# Patient Record
Sex: MALE | Race: WHITE | HISPANIC OR LATINO | Employment: FULL TIME | URBAN - METROPOLITAN AREA
[De-identification: names, ages, dates, MRNs, and addresses within clinical notes are randomized per-mention and may not be internally consistent; named-entity substitution may affect disease eponyms.]

---
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
## 2022-12-21 ENCOUNTER — OFFICE VISIT (OUTPATIENT)
Dept: FAMILY MEDICINE | Facility: CLINIC | Age: 22
End: 2022-12-21

## 2022-12-21 VITALS
HEART RATE: 91 BPM | OXYGEN SATURATION: 98 % | SYSTOLIC BLOOD PRESSURE: 132 MMHG | HEIGHT: 75 IN | WEIGHT: 310 LBS | DIASTOLIC BLOOD PRESSURE: 84 MMHG | BODY MASS INDEX: 38.54 KG/M2 | TEMPERATURE: 97.9 F

## 2022-12-21 DIAGNOSIS — Z00.00 ROUTINE GENERAL MEDICAL EXAMINATION AT A HEALTH CARE FACILITY: Primary | ICD-10-CM

## 2022-12-21 DIAGNOSIS — N47.1 PHIMOSIS OF PENIS: ICD-10-CM

## 2022-12-21 DIAGNOSIS — E66.9 OBESITY (BMI 30-39.9): ICD-10-CM

## 2022-12-21 DIAGNOSIS — L70.0 ACNE VULGARIS: ICD-10-CM

## 2022-12-21 PROCEDURE — 99395 PREV VISIT EST AGE 18-39: CPT | Performed by: FAMILY MEDICINE

## 2022-12-21 NOTE — PROGRESS NOTES
3  SUBJECTIVE:   CC: David Orta is an 22 year old male who presents for preventive health visit.       Patient has been advised of split billing requirements and indicates understanding: Yes  Healthy Habits:    Do you get at least three servings of calcium containing foods daily (dairy, green leafy vegetables, etc.)? yes    Amount of exercise or daily activities, outside of work: 4 day(s) per week    Problems taking medications regularly No    Medication side effects: No    Have you had an eye exam in the past two years? yes    Do you see a dentist twice per year? yes    Do you have sleep apnea, excessive snoring or daytime drowsiness?no          Today's PHQ-2 Score:   PHQ-2 ( 1999 Pfizer) 12/21/2022 11/24/2021   Q1: Little interest or pleasure in doing things 0 0   Q2: Feeling down, depressed or hopeless 0 0   PHQ-2 Score 0 0   PHQ-2 Total Score (12-17 Years)- Positive if 3 or more points; Administer PHQ-A if positive - -       Abuse: Current or Past(Physical, Sexual or Emotional)- No  Do you feel safe in your environment? Yes        Social History     Tobacco Use     Smoking status: Never     Smokeless tobacco: Never   Substance Use Topics     Alcohol use: No     If you drink alcohol do you typically have >3 drinks per day or >7 drinks per week? No                      Last PSA: No results found for: PSA    Reviewed orders with patient. Reviewed health maintenance and updated orders accordingly - Yes  BP Readings from Last 3 Encounters:   12/21/22 132/84   11/24/21 134/82   10/28/20 130/84    Wt Readings from Last 3 Encounters:   12/21/22 140.6 kg (310 lb)   11/24/21 131.9 kg (290 lb 12.8 oz) (>99 %, Z= 2.96)*   10/28/20 125.1 kg (275 lb 12.8 oz) (>99 %, Z= 2.78)*     * Growth percentiles are based on CDC (Boys, 2-20 Years) data.                  Patient Active Problem List   Diagnosis     Health Care Home     Abnormal fasting glucose     Obesity (BMI 30-39.9)     Left varicocele     Phimosis     ACP (advance  care planning)     Acne vulgaris     Past Surgical History:   Procedure Laterality Date     LAPAROSCOPIC APPENDECTOMY N/A 4/13/2015    Procedure: LAPAROSCOPIC APPENDECTOMY;  Surgeon: Kapil Griffin MD;  Location:  OR       Social History     Tobacco Use     Smoking status: Never     Smokeless tobacco: Never   Substance Use Topics     Alcohol use: No     Family History   Problem Relation Age of Onset     Diabetes Mother      Diabetes Father      Cerebrovascular Disease Maternal Aunt          No current outpatient medications on file.     Allergies   Allergen Reactions     Cats      Recent Labs   Lab Test 11/24/21  1509 10/28/20  1149 10/28/20  0849 11/22/19  0000 12/04/18  0951 10/19/17  0907 04/12/15  2319   A1C  --   --  5.3  --   --   --   --    LDL 89 84  --  64  --    < >  --    HDL 51 52  --  58  --    < >  --    TRIG 48 51  --  39  --    < >  --    ALT  --   --   --   --  67*  --  77*   CR  --   --   --   --  0.78  --  0.54   GFRESTIMATED  --   --   --   --   --   --  GFR not calculated, patient <16 years old.  Non  GFR Calc     GFRESTBLACK  --   --   --   --   --   --  GFR not calculated, patient <16 years old.   GFR Calc     POTASSIUM  --   --   --   --  4.3  --  3.8    < > = values in this interval not displayed.        Reviewed and updated as needed this visit by clinical staff   Tobacco  Allergies  Meds  Problems             Reviewed and updated as needed this visit by Provider                 No past medical history on file.   Past Surgical History:   Procedure Laterality Date     LAPAROSCOPIC APPENDECTOMY N/A 4/13/2015    Procedure: LAPAROSCOPIC APPENDECTOMY;  Surgeon: Kapil Griffin MD;  Location:  OR       ROS:  CONSTITUTIONAL: NEGATIVE for fever, chills, change in weight  INTEGUMENTARY/SKIN: NEGATIVE for worrisome rashes, moles or lesions  EYES: NEGATIVE for vision changes or irritation  ENT: NEGATIVE for ear, mouth and throat problems  RESP:  "NEGATIVE for significant cough or SOB  CV: NEGATIVE for chest pain, palpitations or peripheral edema  GI: NEGATIVE for nausea, abdominal pain, heartburn, or change in bowel habits   male: negative for dysuria, hematuria, decreased urinary stream, erectile dysfunction, urethral discharge  MUSCULOSKELETAL: NEGATIVE for significant arthralgias or myalgia  NEURO: NEGATIVE for weakness, dizziness or paresthesias  ENDOCRINE: NEGATIVE for temperature intolerance, skin/hair changes  HEME/ALLERGY/IMMUNE: NEGATIVE for bleeding problems  PSYCHIATRIC: NEGATIVE for changes in mood or affect    OBJECTIVE:   /84 (BP Location: Right arm, Patient Position: Sitting, Cuff Size: Adult Large)   Pulse 91   Temp 97.9  F (36.6  C) (Temporal)   Ht 1.892 m (6' 2.5\")   Wt 140.6 kg (310 lb)   SpO2 98%   BMI 39.27 kg/m    EXAM:  GENERAL: healthy, alert and no distress  EYES: Eyes grossly normal to inspection, PERRL and conjunctivae and sclerae normal  HENT: ear canals and TM's normal, nose and mouth without ulcers or lesions  NECK: no adenopathy, no asymmetry, masses, or scars and thyroid normal to palpation  RESP: lungs clear to auscultation - no rales, rhonchi or wheezes  CV: regular rate and rhythm, normal S1 S2, no S3 or S4, no murmur, click or rub, no peripheral edema and peripheral pulses strong  ABDOMEN: soft, nontender, no hepatosplenomegaly, no masses and bowel sounds normal   (male): no lesions, unable to fully retract foreskin lesions or urethral discharge, no hernia  MS: no gross musculoskeletal defects noted, no edema  SKIN: no suspicious lesions or rashes, mild to moderate inflammatory acne face and trunk  NEURO: Normal strength and tone, mentation intact and speech normal  PSYCH: mentation appears normal, affect normal/bright    Diagnostic Test Results:  Labs reviewed in Epic    ASSESSMENT/PLAN:   (Z00.00) Routine general medical examination at a health care facility  (primary encounter diagnosis)  Comment: " "discussed preventitive healthcare   Plan: Continue to work on healthy diet and exercise, discussed healthy habits     (L70.0) Acne vulgaris  Comment: pt not bothered by acne- discussed options if he would like to treat again in future  Plan:     (E66.9) Obesity (BMI 30-39.9)  Comment: discussed healthy eating habits- main problem in portion control  Plan: Continue to work on healthy diet and exercise, discussed healthy habits     (N47.1) Phimosis of penis  Comment: pt has seen urology in past and treated with cream-not better -recommnd he go back to urology-he will look for info at home  Plan: call if needs new referral-would be adult now    Patient has been advised of split billing requirements and indicates understanding: Yes  COUNSELING:  Reviewed preventive health counseling, as reflected in patient instructions       Regular exercise       Healthy diet/nutrition       Vision screening       Immunizations    Declined: Human Papillomavirus and Influenza             Estimated body mass index is 39.27 kg/m  as calculated from the following:    Height as of this encounter: 1.892 m (6' 2.5\").    Weight as of this encounter: 140.6 kg (310 lb).    Weight management plan: Discussed healthy diet and exercise guidelines    He reports that he has never smoked. He has never used smokeless tobacco.      Counseling Resources:  ATP IV Guidelines  Pooled Cohorts Equation Calculator  FRAX Risk Assessment  ICSI Preventive Guidelines  Dietary Guidelines for Americans, 2010  USDA's MyPlate  ASA Prophylaxis  Lung CA Screening    Anastacio Lawrence MD  [facility]  "

## 2022-12-21 NOTE — NURSING NOTE
Chief Complaint   Patient presents with     Physical     Non-fasting today      Pre-visit Screening:  Immunizations:  up to date  Colonoscopy:  NA  Mammogram: NA  Asthma Action Test/Plan:  NA  PHQ9:  NA  GAD7:  NA  Questioned patient about current smoking habits Pt. has never smoked.  Ok to leave detailed message on voice mail for today's visit only Yes, phone # [phone]

## 2023-02-10 ENCOUNTER — OFFICE VISIT (OUTPATIENT)
Dept: UROLOGY | Facility: CLINIC | Age: 23
End: 2023-02-10

## 2023-02-10 VITALS
SYSTOLIC BLOOD PRESSURE: 133 MMHG | DIASTOLIC BLOOD PRESSURE: 85 MMHG | WEIGHT: 295 LBS | HEART RATE: 80 BPM | BODY MASS INDEX: 36.68 KG/M2 | HEIGHT: 75 IN

## 2023-02-10 DIAGNOSIS — N47.1 PHIMOSIS: Primary | ICD-10-CM

## 2023-02-10 PROCEDURE — 99203 OFFICE O/P NEW LOW 30 MIN: CPT | Performed by: UROLOGY

## 2023-02-10 ASSESSMENT — PAIN SCALES - GENERAL: PAINLEVEL: NO PAIN (0)

## 2023-02-10 NOTE — LETTER
"2/10/2023       RE: David Orta  [id number]  [facility] [place] [id number]     Dear Colleague,    Thank you for referring your patient, David Orta, to the [facility] [place] at [facility]. Please see a copy of my visit note below.    [facility]  Main Office: [address]  [address]  [place] [address]       CHIEF COMPLAINT:  Difficulties with foreskin    HISTORY:   This is a healthy 22-year-old gentleman who reports difficulties with his foreskin.  He tells me that he \"has never been able to pull back my foreskin \".  He understands that he is supposed to pulling back the foreskin periodically and in the shower to keep things clean but says he has never been able to pull it back.  He says he did see a urologist about 2 or 3 years ago and was given a cream to use but he never really used it.      PAST MEDICAL HISTORY: History reviewed. No pertinent past medical history.    PAST SURGICAL HISTORY:   Past Surgical History:   Procedure Laterality Date     LAPAROSCOPIC APPENDECTOMY N/A 04/13/2015    Procedure: LAPAROSCOPIC APPENDECTOMY;  Surgeon: Kapil Griffin MD;  Location:  OR       FAMILY HISTORY:   Family History   Problem Relation Age of Onset     Diabetes Mother      Diabetes Father      Cerebrovascular Disease Maternal Aunt        SOCIAL HISTORY:   Social History     Tobacco Use     Smoking status: Never     Smokeless tobacco: Never   Substance Use Topics     Alcohol use: No          Allergies   Allergen Reactions     Cats        No current outpatient medications on file.    Review Of Systems:  Skin: No rash, pruritis, or skin pigmentation  Eyes: No changes in vision  Ears/Nose/Throat: No changes in hearing, no nosebleeds  Respiratory: No shortness of breath, dyspnea on exertion, cough, or hemoptysis  Cardiovascular: No chest pain or palpitations  Gastrointestinal: No diarrhea or constipation. No abdominal pain. No " "hematochezia  Genitourinary: see HPI  Musculoskeletal: No pain or swelling of joints, normal range of motion  Neurologic: No weakness or tremors  Psychiatric: No recent changes in memory or mood  Hematologic/Lymphatic/Immunologic: No easy bruising or enlarged lymph nodes  Endocrine: No weight gain or loss      PHYSICAL EXAM:    /85   Pulse 80   Ht 1.905 m (6' 3\")   Wt 133.8 kg (295 lb)   BMI 36.87 kg/m    General appearance: In NAD, conversant  HEENT: Normocephalic and atraumatic, anicteric sclera  Cardiovascular: Not examined  Respiratory: normal, non-labored breathing  Gastrointestinal: negative, Abdomen soft, non-tender, and non-distended.   Musculoskeletal: Not Examined  Peripheral Vascular/extremity: No peripheral edema  Skin: Normal temperature, turgor, and texture. No rash  Psychiatric: Appropriate affect, alert and oriented to person, place, and time    Penis: Normal.  He has a normal uncircumcised circumcised penis without phimosis or balanitis.  Please see notes below for details.  Scrotal skin: Normal, no lesions  Testicles: Normal to palpation bilaterally  Epididymis: Normal to palpation bilaterally  Lymphatic: Normal inguinal lymph nodes    Digital Rectal Exam:     Cystoscopy: Not done      PSA:     UA RESULTS:  Recent Labs   Lab Test 04/12/15  2357   COLOR Yellow   APPEARANCE Clear   URINEGLC Negative   URINEBILI Negative   URINEKETONE 5*   SG 1.029   UBLD Negative   URINEPH 6.5   PROTEIN 10*   NITRITE Negative   LEUKEST Negative   RBCU <1   WBCU <1       Bladder Scan:     Other Labs:      Imaging Studies: None      CLINICAL IMPRESSION:   Problems with foreskin    PLAN:   On examination today, the penile examination is visibly normal.  The foreskin appears normal.  When the foreskin is pulled back the glans penis appears completely normal with no balanitis, no effusions, no skin bridges.  However, he was not able to let me foreskin back completely because he says the glans penis becomes very " sensitive when he starts to pull back the foreskin.  I did ask him to pull back foreskin for me and he was able to pull back the foreskin, although he did this very hesitantly due to what he described as discomfort.  The glans penis is completely normal.  The foreskin appears normal and shows no phimotic rings or other abnormalities.  There is no smegma under the foreskin and tissues were all quite clean.    I explained to him that it is not entirely clear what is causing his discomfort with his examination looking normal.  He could certainly proceed with circumcision and I offered him the solution.  We did discuss circumcision today.  However, he is quite sensitive in the glans penis and understands that this would likely lead to a difficult recovery.  However, in light of this I counseled him that I really do not think he needed a circumcision at this time.  His examination appears very normal to me.  We discussed possibly trying a steroid cream, explained that this is an anti-inflammatory.  However, I see no evidence of inflammation or balanitis at this time.  Another more conservative solution would be to have him pull back the foreskin in the shower on at least a daily basis.  He is sensitive at this time for uncertain reasons, but the hope would be that sensitivity would diminish with time.  Either way, it does appear he is cleaning under the foreskin somehow as tissues are quite clean today.    He will try to pull back the foreskin in the shower on a daily basis and he is welcome to follow-up with me as needed in the future.      Ryley Medrano MD

## 2023-02-10 NOTE — NURSING NOTE
Chief Complaint   Patient presents with     Phimosis     Pt states he has never been able to fully pull back foreskin, pt states he has tried but this is painful.    Shefali Spring, CMA

## 2023-02-10 NOTE — PROGRESS NOTES
"[facility]  Main Office: [address]  [address]  [place] [address]       CHIEF COMPLAINT:  Difficulties with foreskin    HISTORY:   This is a healthy 22-year-old gentleman who reports difficulties with his foreskin.  He tells me that he \"has never been able to pull back my foreskin \".  He understands that he is supposed to pulling back the foreskin periodically and in the shower to keep things clean but says he has never been able to pull it back.  He says he did see a urologist about 2 or 3 years ago and was given a cream to use but he never really used it.      PAST MEDICAL HISTORY: History reviewed. No pertinent past medical history.    PAST SURGICAL HISTORY:   Past Surgical History:   Procedure Laterality Date     LAPAROSCOPIC APPENDECTOMY N/A 04/13/2015    Procedure: LAPAROSCOPIC APPENDECTOMY;  Surgeon: Kapil Griffin MD;  Location:  OR       FAMILY HISTORY:   Family History   Problem Relation Age of Onset     Diabetes Mother      Diabetes Father      Cerebrovascular Disease Maternal Aunt        SOCIAL HISTORY:   Social History     Tobacco Use     Smoking status: Never     Smokeless tobacco: Never   Substance Use Topics     Alcohol use: No          Allergies   Allergen Reactions     Cats        No current outpatient medications on file.    Review Of Systems:  Skin: No rash, pruritis, or skin pigmentation  Eyes: No changes in vision  Ears/Nose/Throat: No changes in hearing, no nosebleeds  Respiratory: No shortness of breath, dyspnea on exertion, cough, or hemoptysis  Cardiovascular: No chest pain or palpitations  Gastrointestinal: No diarrhea or constipation. No abdominal pain. No hematochezia  Genitourinary: see HPI  Musculoskeletal: No pain or swelling of joints, normal range of motion  Neurologic: No weakness or tremors  Psychiatric: No recent changes in memory or mood  Hematologic/Lymphatic/Immunologic: No easy bruising or enlarged lymph nodes  Endocrine: No weight gain or " "loss      PHYSICAL EXAM:    /85   Pulse 80   Ht 1.905 m (6' 3\")   Wt 133.8 kg (295 lb)   BMI 36.87 kg/m    General appearance: In NAD, conversant  HEENT: Normocephalic and atraumatic, anicteric sclera  Cardiovascular: Not examined  Respiratory: normal, non-labored breathing  Gastrointestinal: negative, Abdomen soft, non-tender, and non-distended.   Musculoskeletal: Not Examined  Peripheral Vascular/extremity: No peripheral edema  Skin: Normal temperature, turgor, and texture. No rash  Psychiatric: Appropriate affect, alert and oriented to person, place, and time    Penis: Normal.  He has a normal uncircumcised circumcised penis without phimosis or balanitis.  Please see notes below for details.  Scrotal skin: Normal, no lesions  Testicles: Normal to palpation bilaterally  Epididymis: Normal to palpation bilaterally  Lymphatic: Normal inguinal lymph nodes    Digital Rectal Exam:     Cystoscopy: Not done      PSA:     UA RESULTS:  Recent Labs   Lab Test 04/12/15  2357   COLOR Yellow   APPEARANCE Clear   URINEGLC Negative   URINEBILI Negative   URINEKETONE 5*   SG 1.029   UBLD Negative   URINEPH 6.5   PROTEIN 10*   NITRITE Negative   LEUKEST Negative   RBCU <1   WBCU <1       Bladder Scan:     Other Labs:      Imaging Studies: None      CLINICAL IMPRESSION:   Problems with foreskin    PLAN:   On examination today, the penile examination is visibly normal.  The foreskin appears normal.  When the foreskin is pulled back the glans penis appears completely normal with no balanitis, no effusions, no skin bridges.  However, he was not able to let me foreskin back completely because he says the glans penis becomes very sensitive when he starts to pull back the foreskin.  I did ask him to pull back foreskin for me and he was able to pull back the foreskin, although he did this very hesitantly due to what he described as discomfort.  The glans penis is completely normal.  The foreskin appears normal and shows no " phimotic rings or other abnormalities.  There is no smegma under the foreskin and tissues were all quite clean.    I explained to him that it is not entirely clear what is causing his discomfort with his examination looking normal.  He could certainly proceed with circumcision and I offered him the solution.  We did discuss circumcision today.  However, he is quite sensitive in the glans penis and understands that this would likely lead to a difficult recovery.  However, in light of this I counseled him that I really do not think he needed a circumcision at this time.  His examination appears very normal to me.  We discussed possibly trying a steroid cream, explained that this is an anti-inflammatory.  However, I see no evidence of inflammation or balanitis at this time.  Another more conservative solution would be to have him pull back the foreskin in the shower on at least a daily basis.  He is sensitive at this time for uncertain reasons, but the hope would be that sensitivity would diminish with time.  Either way, it does appear he is cleaning under the foreskin somehow as tissues are quite clean today.    He will try to pull back the foreskin in the shower on a daily basis and he is welcome to follow-up with me as needed in the future.      Ryley Medrano MD

## 2023-12-16 ENCOUNTER — APPOINTMENT (OUTPATIENT)
Dept: GENERAL RADIOLOGY | Facility: CLINIC | Age: 23
End: 2023-12-16
Attending: EMERGENCY MEDICINE
Payer: COMMERCIAL

## 2023-12-16 ENCOUNTER — HOSPITAL ENCOUNTER (EMERGENCY)
Facility: CLINIC | Age: 23
Discharge: HOME | End: 2023-12-16
Attending: EMERGENCY MEDICINE | Admitting: EMERGENCY MEDICINE
Payer: COMMERCIAL

## 2023-12-16 VITALS
RESPIRATION RATE: 18 BRPM | BODY MASS INDEX: 37.22 KG/M2 | HEIGHT: 74 IN | DIASTOLIC BLOOD PRESSURE: 86 MMHG | OXYGEN SATURATION: 98 % | HEART RATE: 76 BPM | TEMPERATURE: 97.8 F | SYSTOLIC BLOOD PRESSURE: 128 MMHG | WEIGHT: 290 LBS

## 2023-12-16 DIAGNOSIS — R07.9 CHEST PAIN, UNSPECIFIED TYPE: ICD-10-CM

## 2023-12-16 DIAGNOSIS — R42 LIGHTHEADEDNESS: ICD-10-CM

## 2023-12-16 LAB
ANION GAP SERPL CALCULATED.3IONS-SCNC: 10 MMOL/L (ref 7–15)
BASOPHILS # BLD AUTO: 0.1 10E3/UL (ref 0–0.2)
BASOPHILS NFR BLD AUTO: 1 %
BUN SERPL-MCNC: 14.3 MG/DL (ref 6–20)
CALCIUM SERPL-MCNC: 8.9 MG/DL (ref 8.6–10)
CHLORIDE SERPL-SCNC: 103 MMOL/L (ref 98–107)
CREAT SERPL-MCNC: 0.73 MG/DL (ref 0.67–1.17)
DEPRECATED HCO3 PLAS-SCNC: 22 MMOL/L (ref 22–29)
EGFRCR SERPLBLD CKD-EPI 2021: >90 ML/MIN/1.73M2
EOSINOPHIL # BLD AUTO: 0.2 10E3/UL (ref 0–0.7)
EOSINOPHIL NFR BLD AUTO: 3 %
ERYTHROCYTE [DISTWIDTH] IN BLOOD BY AUTOMATED COUNT: 11.9 % (ref 10–15)
GLUCOSE SERPL-MCNC: 109 MG/DL (ref 70–99)
HCT VFR BLD AUTO: 45.4 % (ref 40–53)
HGB BLD-MCNC: 15 G/DL (ref 13.3–17.7)
HOLD SPECIMEN: NORMAL
HOLD SPECIMEN: NORMAL
IMM GRANULOCYTES # BLD: 0 10E3/UL
IMM GRANULOCYTES NFR BLD: 1 %
LYMPHOCYTES # BLD AUTO: 2.1 10E3/UL (ref 0.8–5.3)
LYMPHOCYTES NFR BLD AUTO: 26 %
MCH RBC QN AUTO: 28.9 PG (ref 26.5–33)
MCHC RBC AUTO-ENTMCNC: 33 G/DL (ref 31.5–36.5)
MCV RBC AUTO: 88 FL (ref 78–100)
MONOCYTES # BLD AUTO: 0.5 10E3/UL (ref 0–1.3)
MONOCYTES NFR BLD AUTO: 6 %
NEUTROPHILS # BLD AUTO: 5.2 10E3/UL (ref 1.6–8.3)
NEUTROPHILS NFR BLD AUTO: 63 %
NRBC # BLD AUTO: 0 10E3/UL
NRBC BLD AUTO-RTO: 0 /100
PLATELET # BLD AUTO: 234 10E3/UL (ref 150–450)
POTASSIUM SERPL-SCNC: 3.9 MMOL/L (ref 3.4–5.3)
RBC # BLD AUTO: 5.19 10E6/UL (ref 4.4–5.9)
SODIUM SERPL-SCNC: 135 MMOL/L (ref 135–145)
TROPONIN T SERPL HS-MCNC: <6 NG/L
WBC # BLD AUTO: 8.1 10E3/UL (ref 4–11)

## 2023-12-16 PROCEDURE — 36415 COLL VENOUS BLD VENIPUNCTURE: CPT | Performed by: EMERGENCY MEDICINE

## 2023-12-16 PROCEDURE — 71046 X-RAY EXAM CHEST 2 VIEWS: CPT

## 2023-12-16 PROCEDURE — 80048 BASIC METABOLIC PNL TOTAL CA: CPT | Performed by: EMERGENCY MEDICINE

## 2023-12-16 PROCEDURE — 84484 ASSAY OF TROPONIN QUANT: CPT | Performed by: EMERGENCY MEDICINE

## 2023-12-16 PROCEDURE — 93005 ELECTROCARDIOGRAM TRACING: CPT

## 2023-12-16 PROCEDURE — 99285 EMERGENCY DEPT VISIT HI MDM: CPT | Mod: 25

## 2023-12-16 PROCEDURE — 85004 AUTOMATED DIFF WBC COUNT: CPT | Performed by: EMERGENCY MEDICINE

## 2023-12-16 ASSESSMENT — ACTIVITIES OF DAILY LIVING (ADL): ADLS_ACUITY_SCORE: 35

## 2023-12-17 NOTE — ED PROVIDER NOTES
"    History     Chief Complaint:  Chest Pain     The history is provided by the patient.      David Orta is a 23 year old male who presents to the ED with his mom for evaluation of chest pain. The patient reports that since Tuesday or Wednesday, he has had off and on pressure in his chest. Laying down brings this chest pressure on and today he states he could feel his heart breathing when laying down. He denies chest pain, but he describes this feeling as causing him to breath more. Adds that he felt dizzy earlier. Mom reports that there is a family history of heart problems and anxiety, but  denies feeling anxious or panicky. Denies fever, chills, cough, runny nose, vomiting, diarrhea, leg swelling, acid taste in his mouth, or recent long travel.    Independent Historian:    Mother - They report as noted above.    Review of External Notes:        Medications:    The patient is not currently taking any prescribed medications.     Past Medical History:    Obesity   Left varicocele  Phimosis  Acne vulgaris    Past Surgical History:    Laparoscopic appendectomy    Physical Exam   Patient Vitals for the past 24 hrs:   BP Temp Temp src Pulse Resp SpO2 Height Weight   12/16/23 1924 128/86 97.8  F (36.6  C) Temporal 76 18 98 % 1.88 m (6' 2\") 131.5 kg (290 lb)      Physical Exam  Constitutional: Well appearing.  HEENT: Atraumatic.  Moist mucous membranes.  Neck: Soft.  Supple.   Cardiac: Regular rate and rhythm.  No murmur or rub.  Respiratory: Clear to auscultation bilaterally.  No respiratory distress.  No wheezing, rhonchi, or rales.  Abdomen: Soft and nontender.  No rebound or guarding.  Nondistended.  Musculoskeletal: No edema.  Normal range of motion.  Neurologic: Alert and oriented x3.  Normal tone and bulk.  Normal gait.  Skin: No rashes.  No edema.  Psych: Normal affect.  Normal behavior.            Emergency Department Course   ECG  ECG results from 12/16/23   EKG 12-lead, tracing only     Value    " Systolic Blood Pressure     Diastolic Blood Pressure     Ventricular Rate 74    Atrial Rate 74    HI Interval 140    QRS Duration 96        QTc 406    P Axis 37    R AXIS 28    T Axis 24    Interpretation ECG      Sinus rhythm  Normal ECG  No previous ECGs available   Interpreted by me @ 2030      Imaging:  XR Chest 2 Views   Final Result   IMPRESSION: Heart is normal in size. Lungs are clear.        Report per radiology    Laboratory:  Labs Ordered and Resulted from Time of ED Arrival to Time of ED Departure   BASIC METABOLIC PANEL - Abnormal       Result Value    Sodium 135      Potassium 3.9      Chloride 103      Carbon Dioxide (CO2) 22      Anion Gap 10      Urea Nitrogen 14.3      Creatinine 0.73      GFR Estimate >90      Calcium 8.9      Glucose 109 (*)    TROPONIN T, HIGH SENSITIVITY - Normal    Troponin T, High Sensitivity <6     CBC WITH PLATELETS AND DIFFERENTIAL    WBC Count 8.1      RBC Count 5.19      Hemoglobin 15.0      Hematocrit 45.4      MCV 88      MCH 28.9      MCHC 33.0      RDW 11.9      Platelet Count 234      % Neutrophils 63      % Lymphocytes 26      % Monocytes 6      % Eosinophils 3      % Basophils 1      % Immature Granulocytes 1      NRBCs per 100 WBC 0      Absolute Neutrophils 5.2      Absolute Lymphocytes 2.1      Absolute Monocytes 0.5      Absolute Eosinophils 0.2      Absolute Basophils 0.1      Absolute Immature Granulocytes 0.0      Absolute NRBCs 0.0        Procedures       Emergency Department Course & Assessments:     Interventions:  Medications - No data to display     Independent Interpretation (X-rays, CTs, rhythm strip):  I independently reviewed the chest XR and see acute infiltrate or pneumothorax    Assessments/Consultations/Discussion of Management or Tests:  ED Course as of 12/16/23 2038   Sat Dec 16, 2023   2032 I obtained history and examined the patient as noted above.      Social Determinants of Health affecting care:  None      Disposition:  The patient  was discharged to home.     Impression & Plan      Medical Decision Making:  David Orta  is a 23-year-old man who is afebrile and hemodynamically stable.  Broad differential was considered.  EKG demonstrates a normal sinus rhythm with no acute ischemic changes.  Troponin is undetectable and he is low risk for coronary artery disease with a very low heart score with no evidence of ACS, myocarditis, or pericarditis.  Chest x-ray without acute cardiopulmonary disease.  He is PERC rule negative and no tachycardia, DVT signs or symptoms, risk factors, tachycardia, or hypoxia to suggest a PE.  There is concern for some anxiety related to his symptoms.  At this point, his atypical symptoms and very young healthy man with reassuring workup.  I think he is safe for discharge home with close primary care follow-up.  He is in agreement feels,'s plan.  His questions were answered and he was in no distress at time of discharge.    Diagnosis:    ICD-10-CM    1. Chest pain, unspecified type  R07.9       2. Lightheadedness  R42            Discharge Medications:  New Prescriptions    No medications on file      Scribe Disclosure:  ESTHER KHAN, am serving as a scribe at 8:25 PM on 12/16/2023 to document services personally performed by Wm Troy MD based on my observations and the provider's statements to me.    12/16/2023   Wm Troy MD Salay, Nicholas J, MD  12/27/23 1606

## 2023-12-18 LAB
ATRIAL RATE - MUSE: 74 BPM
DIASTOLIC BLOOD PRESSURE - MUSE: NORMAL MMHG
INTERPRETATION ECG - MUSE: NORMAL
P AXIS - MUSE: 37 DEGREES
PR INTERVAL - MUSE: 140 MS
QRS DURATION - MUSE: 96 MS
QT - MUSE: 366 MS
QTC - MUSE: 406 MS
R AXIS - MUSE: 28 DEGREES
SYSTOLIC BLOOD PRESSURE - MUSE: NORMAL MMHG
T AXIS - MUSE: 24 DEGREES
VENTRICULAR RATE- MUSE: 74 BPM

## 2023-12-26 ENCOUNTER — OFFICE VISIT (OUTPATIENT)
Dept: FAMILY MEDICINE | Facility: CLINIC | Age: 23
End: 2023-12-26

## 2023-12-26 VITALS
OXYGEN SATURATION: 97 % | DIASTOLIC BLOOD PRESSURE: 86 MMHG | SYSTOLIC BLOOD PRESSURE: 128 MMHG | RESPIRATION RATE: 22 BRPM | TEMPERATURE: 98 F | BODY MASS INDEX: 38.37 KG/M2 | WEIGHT: 299 LBS | HEIGHT: 74 IN | HEART RATE: 75 BPM

## 2023-12-26 DIAGNOSIS — R29.818 SUSPECTED SLEEP APNEA: ICD-10-CM

## 2023-12-26 DIAGNOSIS — J34.2 DEVIATED NASAL SEPTUM: ICD-10-CM

## 2023-12-26 DIAGNOSIS — R51.9 INTERMITTENT HEADACHE: ICD-10-CM

## 2023-12-26 DIAGNOSIS — E66.812 CLASS 2 SEVERE OBESITY DUE TO EXCESS CALORIES WITH SERIOUS COMORBIDITY IN ADULT, UNSPECIFIED BMI (H): ICD-10-CM

## 2023-12-26 DIAGNOSIS — E66.01 CLASS 2 SEVERE OBESITY DUE TO EXCESS CALORIES WITH SERIOUS COMORBIDITY IN ADULT, UNSPECIFIED BMI (H): ICD-10-CM

## 2023-12-26 DIAGNOSIS — R42 LIGHT-HEADED FEELING: ICD-10-CM

## 2023-12-26 DIAGNOSIS — E55.9 VITAMIN D DEFICIENCY: ICD-10-CM

## 2023-12-26 DIAGNOSIS — Z13.220 LIPID SCREENING: ICD-10-CM

## 2023-12-26 DIAGNOSIS — Z00.00 ROUTINE GENERAL MEDICAL EXAMINATION AT A HEALTH CARE FACILITY: Primary | ICD-10-CM

## 2023-12-26 LAB
CHOLEST SERPL-MCNC: 145 MG/DL (ref 0–199)
CHOLEST/HDLC SERPL: 3 {RATIO} (ref 0–5)
HBA1C MFR BLD: 5.7 % (ref 4–7)
HDLC SERPL-MCNC: 51 MG/DL (ref 40–150)
LDLC SERPL CALC-MCNC: 84 MG/DL (ref 0–130)
TRIGL SERPL-MCNC: 66 MG/DL (ref 0–149)

## 2023-12-26 PROCEDURE — 36415 COLL VENOUS BLD VENIPUNCTURE: CPT | Performed by: STUDENT IN AN ORGANIZED HEALTH CARE EDUCATION/TRAINING PROGRAM

## 2023-12-26 PROCEDURE — 83036 HEMOGLOBIN GLYCOSYLATED A1C: CPT | Performed by: STUDENT IN AN ORGANIZED HEALTH CARE EDUCATION/TRAINING PROGRAM

## 2023-12-26 PROCEDURE — 99395 PREV VISIT EST AGE 18-39: CPT | Mod: 25 | Performed by: STUDENT IN AN ORGANIZED HEALTH CARE EDUCATION/TRAINING PROGRAM

## 2023-12-26 PROCEDURE — 99213 OFFICE O/P EST LOW 20 MIN: CPT | Mod: 25 | Performed by: STUDENT IN AN ORGANIZED HEALTH CARE EDUCATION/TRAINING PROGRAM

## 2023-12-26 PROCEDURE — 80061 LIPID PANEL: CPT | Performed by: STUDENT IN AN ORGANIZED HEALTH CARE EDUCATION/TRAINING PROGRAM

## 2023-12-26 ASSESSMENT — PATIENT HEALTH QUESTIONNAIRE - PHQ9
SUM OF ALL RESPONSES TO PHQ QUESTIONS 1-9: 5
5. POOR APPETITE OR OVEREATING: NOT AT ALL

## 2023-12-26 ASSESSMENT — ANXIETY QUESTIONNAIRES
6. BECOMING EASILY ANNOYED OR IRRITABLE: NOT AT ALL
1. FEELING NERVOUS, ANXIOUS, OR ON EDGE: MORE THAN HALF THE DAYS
2. NOT BEING ABLE TO STOP OR CONTROL WORRYING: SEVERAL DAYS
5. BEING SO RESTLESS THAT IT IS HARD TO SIT STILL: NOT AT ALL
7. FEELING AFRAID AS IF SOMETHING AWFUL MIGHT HAPPEN: MORE THAN HALF THE DAYS
3. WORRYING TOO MUCH ABOUT DIFFERENT THINGS: SEVERAL DAYS
GAD7 TOTAL SCORE: 6
GAD7 TOTAL SCORE: 6
IF YOU CHECKED OFF ANY PROBLEMS ON THIS QUESTIONNAIRE, HOW DIFFICULT HAVE THESE PROBLEMS MADE IT FOR YOU TO DO YOUR WORK, TAKE CARE OF THINGS AT HOME, OR GET ALONG WITH OTHER PEOPLE: NOT DIFFICULT AT ALL

## 2023-12-26 NOTE — PROGRESS NOTES
SUBJECTIVE:   CC: David Orta is an 23 year old male who presents for preventive health visit.     Patient has been advised of split billing requirements and indicates understanding: Yes    Nursing Notes:   Nikki Obrien CMA  12/26/2023  7:41 AM  Signed  Chief Complaint   Patient presents with    Physical     Annual, fasting     Consult For     Was seen in the ER on 12/16/23 for chest pain, was told that he may have anxiety, has been struggling with headaches lately to and mom feels that it has something to do with his anxiety     Nose Problem     Starting about 6-7 years ago that he was not able to breath through one nostril in his nose, was told that nothing could be at that time but when he is older he should see an ENT doctor, needs referral for this      Pre-visit Screening:  Immunizations:  up to date  Colonoscopy:  na  Mammogram: na  Asthma Action Test/Plan:  na  PHQ9:  given today   GAD7:  given today   Questioned patient about current smoking habits Pt. has never smoked.  Ok to leave detailed message on voice mail for today's visit only yes, phone # [phone] (home)        Healthy Habits:  Diet: balanced  Exercise: 4x/wk  Mental Health: recent more stress, lost close family friend 2 wks ago     Problems taking medications regularly N/a  Have you had an eye exam in the past two years? yes  Do you see a dentist twice per year? yes  Do you have sleep apnea, excessive snoring or daytime drowsiness? snoring    PROBLEMS TO ADD ON...  Lightheadedness and Headaches  Duration: 2-3 weeks  Description  Location: bilateral in the temporal area   Character: intermittent ache  Frequency:  LH daily, headaches less often  Precipitating or Alleviating factors:  Nausea/vomiting: no  Dizziness: occasionally  Weakness or numbness: no  Visual changes: none  Fever: no   Sinus or URI symptoms no   History  Head trauma: no  Family history: no fam hx of any similar sx  Previous tests for headaches: no  Able to do daily  "activities when headache present: YES- hasn't missed any work  Wake with headaches: no  Daily pain medication use: no  Any changes in: denies any changes/stressors  Precipitating or Alleviating factors (light/sound/sleep/caffeine): none identified     Today's PHQ-2 Score:       12/26/2023     7:37 AM 12/21/2022     4:49 PM   PHQ-2 ( 1999 [employer])   Q1: Little interest or pleasure in doing things 0 0   Q2: Feeling down, depressed or hopeless 0 0   PHQ-2 Score 0 0       Do you feel safe in your environment? Yes      Social History     Tobacco Use    Smoking status: Never    Smokeless tobacco: Never   Substance Use Topics    Alcohol use: No     If you drink alcohol do you typically have >3 drinks per day or >7 drinks per week? No                      Last PSA: No results found for: \"PSA\"    Reviewed orders with patient. Reviewed health maintenance and updated orders accordingly - Yes  Lab work is in process  Labs reviewed in EPIC  BP Readings from Last 3 Encounters:   12/26/23 128/86   12/16/23 128/86   02/10/23 133/85    Wt Readings from Last 3 Encounters:   12/26/23 135.6 kg (299 lb)   12/16/23 131.5 kg (290 lb)   02/10/23 133.8 kg (295 lb)                    Reviewed and updated as needed this visit by clinical staff   Tobacco  Allergies  Meds              Reviewed and updated as needed this visit by Provider                 No past medical history on file.   Past Surgical History:   Procedure Laterality Date    LAPAROSCOPIC APPENDECTOMY N/A 04/13/2015    Procedure: LAPAROSCOPIC APPENDECTOMY;  Surgeon: Kapil Griffin MD;  Location:  OR     Family History   Problem Relation Age of Onset    Diabetes Mother     Diabetes Father     Cerebrovascular Disease Maternal Aunt        ROS:  12 point ROS performed and negative for new concerns except as mentioned above     OBJECTIVE:   /86 (BP Location: Left arm, Patient Position: Sitting, Cuff Size: Adult Large)   Pulse 75   Temp 98  F (36.7  C) (Temporal)  " " Resp 22   Ht 1.88 m (6' 2\")   Wt 135.6 kg (299 lb)   SpO2 97%   BMI 38.39 kg/m      EXAM:  GENERAL: healthy, alert and no distress  EYES: Eyes grossly normal to inspection, EOMI, PERRL and conjunctivae and sclerae normal  HENT: ear canals and TM's normal, nose and mouth without ulcers or lesions  NECK: no adenopathy, no asymmetry, masses, or scars and thyroid normal to palpation  RESP: lungs clear to auscultation - no rales, rhonchi or wheezes  CV: regular rate and rhythm, normal S1 S2, no S3 or S4, no murmur, click or rub, no peripheral edema and peripheral pulses strong  ABDOMEN: soft, nontender, no hepatosplenomegaly, no masses and bowel sounds normal  MS: no gross musculoskeletal defects noted, no edema  SKIN: no suspicious lesions or rashes  NEURO: CN 2-12 intact, normal strength and tone, mentation intact and speech normal  PSYCH: mentation appears normal, affect normal/bright    Diagnostic Test Results:  Labs reviewed in Epic    ASSESSMENT/PLAN:       ICD-10-CM    1. Routine general medical examination at a health care facility  Z00.00       2. Light-headed feeling  R42 TSH WITH FREE T4 REFLEX ([facility])     HEMOGLOBIN A1C ([facility])     VITAMIN D DEFICIENCY SCREENING ([facility])      3. Intermittent headache  R51.9 VITAMIN D DEFICIENCY SCREENING ([facility])      4. Class 2 severe obesity due to excess calories with serious comorbidity in adult, unspecified BMI (H)  E66.01       5. Deviated nasal septum  J34.2 Adult ENT  Referral - To a [facility] Location (Use POS/Location)      6. Suspected sleep apnea  R29.818 Adult ENT  Referral - To a Non [facility] Location (Use POS/Location)      7. Lipid screening  Z13.220 Lipid Panel ([facility])           Patient has been advised of split billing requirements and indicates understanding: Yes  COUNSELING:  Reviewed preventive health counseling, as reflected in patient instructions       Regular exercise       Healthy diet/nutrition       Vision " "screening       Immunizations       Alcohol Use     Other concerns addressed today (15 min):  Headaches, lightheadedness, grief. No red flags on hx or exam. BMP, CBC and EKG from ER visit reviewed and wnl. High risk for sleep apnea, rec ENT consult to eval septum and discuss sleep study. Offered grief/anxiety resources, therapy referral. Parent asked about brain imaging studies, reviewed r/b and current guidelines not recommending at this time, will reeval in follow-up.    Estimated body mass index is 38.39 kg/m  as calculated from the following:    Height as of this encounter: 1.88 m (6' 2\").    Weight as of this encounter: 135.6 kg (299 lb).    Weight management plan: Discussed healthy diet and exercise guidelines    He reports that he has never smoked. He has never used smokeless tobacco.    Patient Instructions   Labs today    ENT consult     Ok to take tylenol 1000mg 3x/day for headache if needed    Follow-up with me after ENT visit please, sooner if needed      Venu Guzman MD, [facility]   "

## 2023-12-26 NOTE — LETTER
"December 29, 2023      [place] Or[place]  [id number] [address] [facility] [address]        Dear ,    We are writing to inform you of your test results.    Vit D is very low and prescription sent in to your pharmacy. Please follow-up in 3 months to recheck. A1c number also indicates that you are on the border of \"pre-diabetes\", healthy diet and regular exercise important. If you have any questions or concerns, please call the clinic at the number listed above.     Sincerely,    Venu Guzman MD, [facility]     Results for orders placed or performed in visit on 12/26/23   TSH WITH FREE T4 REFLEX ([facility])     Status: None   Result Value Ref Range    TSH 2.74 0.40 - 4.50 mIU/L   HEMOGLOBIN A1C ([facility])     Status: None   Result Value Ref Range    Hemoglobin A1C 5.7 4.0 - 7.0 %   VITAMIN D DEFICIENCY SCREENING ([facility])     Status: Abnormal   Result Value Ref Range    Vitamin D 25-OH Total 11 (L) 30 - 100 ng/mL   Lipid Panel ([facility])     Status: None   Result Value Ref Range    Cholesterol 145 0 - 199 mg/dL    Triglycerides 66 0 - 149 mg/dL    HDL Cholesterol 51 40 - 150 mg/dL    LDL Cholesterol Direct 84 0 - 130 mg/dL    Cholesterol/HDL Ratio 3 0 - 5      "

## 2023-12-26 NOTE — NURSING NOTE
Chief Complaint   Patient presents with    Physical     Annual, fasting     Consult For     Was seen in the ER on 12/16/23 for chest pain, was told that he may have anxiety, has been struggling with headaches lately to and mom feels that it has something to do with his anxiety     Nose Problem     Starting about 6-7 years ago that he was not able to breath through one nostril in his nose, was told that nothing could be at that time but when he is older he should see an ENT doctor, needs referral for this      Pre-visit Screening:  Immunizations:  up to date  Colonoscopy:  na  Mammogram: na  Asthma Action Test/Plan:  na  PHQ9:  given today   GAD7:  given today   Questioned patient about current smoking habits Pt. has never smoked.  Ok to leave detailed message on voice mail for today's visit only yes, phone # [phone] (home)

## 2023-12-26 NOTE — PATIENT INSTRUCTIONS
Labs today    ENT consult     Ok to take tylenol 1000mg 3x/day for headache if needed    Follow-up with me after ENT visit please, sooner if needed

## 2023-12-27 LAB
TSH SERPL-ACNC: 2.74 MIU/L (ref 0.4–4.5)
VITAMIN D, 25-OH, TOTAL - QUEST: 11 NG/ML (ref 30–100)

## 2024-01-29 ENCOUNTER — APPOINTMENT (OUTPATIENT)
Dept: MRI IMAGING | Facility: CLINIC | Age: 24
End: 2024-01-29
Attending: BEHAVIOR TECHNICIAN
Payer: COMMERCIAL

## 2024-01-29 ENCOUNTER — HOSPITAL ENCOUNTER (OUTPATIENT)
Facility: CLINIC | Age: 24
Setting detail: OBSERVATION
Discharge: HOME | End: 2024-01-30
Attending: EMERGENCY MEDICINE | Admitting: HOSPITALIST
Payer: COMMERCIAL

## 2024-01-29 ENCOUNTER — APPOINTMENT (OUTPATIENT)
Dept: CT IMAGING | Facility: CLINIC | Age: 24
End: 2024-01-29
Attending: BEHAVIOR TECHNICIAN
Payer: COMMERCIAL

## 2024-01-29 DIAGNOSIS — G93.0 ARACHNOID CYST: ICD-10-CM

## 2024-01-29 DIAGNOSIS — F41.9 ANXIETY: Primary | ICD-10-CM

## 2024-01-29 DIAGNOSIS — R42 DIZZINESS: ICD-10-CM

## 2024-01-29 LAB
ANION GAP SERPL CALCULATED.3IONS-SCNC: 9 MMOL/L (ref 7–15)
ATRIAL RATE - MUSE: 92 BPM
BASOPHILS # BLD AUTO: 0.1 10E3/UL (ref 0–0.2)
BASOPHILS NFR BLD AUTO: 1 %
BUN SERPL-MCNC: 9.6 MG/DL (ref 6–20)
CALCIUM SERPL-MCNC: 9.2 MG/DL (ref 8.6–10)
CHLORIDE SERPL-SCNC: 98 MMOL/L (ref 98–107)
CREAT SERPL-MCNC: 0.7 MG/DL (ref 0.67–1.17)
DEPRECATED HCO3 PLAS-SCNC: 26 MMOL/L (ref 22–29)
DIASTOLIC BLOOD PRESSURE - MUSE: NORMAL MMHG
EGFRCR SERPLBLD CKD-EPI 2021: >90 ML/MIN/1.73M2
EOSINOPHIL # BLD AUTO: 0.2 10E3/UL (ref 0–0.7)
EOSINOPHIL NFR BLD AUTO: 4 %
ERYTHROCYTE [DISTWIDTH] IN BLOOD BY AUTOMATED COUNT: 12 % (ref 10–15)
GLUCOSE SERPL-MCNC: 101 MG/DL (ref 70–99)
HCT VFR BLD AUTO: 44.1 % (ref 40–53)
HGB BLD-MCNC: 14.9 G/DL (ref 13.3–17.7)
IMM GRANULOCYTES # BLD: 0 10E3/UL
IMM GRANULOCYTES NFR BLD: 1 %
INTERPRETATION ECG - MUSE: NORMAL
LYMPHOCYTES # BLD AUTO: 1.6 10E3/UL (ref 0.8–5.3)
LYMPHOCYTES NFR BLD AUTO: 27 %
MCH RBC QN AUTO: 29.4 PG (ref 26.5–33)
MCHC RBC AUTO-ENTMCNC: 33.8 G/DL (ref 31.5–36.5)
MCV RBC AUTO: 87 FL (ref 78–100)
MONOCYTES # BLD AUTO: 0.4 10E3/UL (ref 0–1.3)
MONOCYTES NFR BLD AUTO: 7 %
NEUTROPHILS # BLD AUTO: 3.6 10E3/UL (ref 1.6–8.3)
NEUTROPHILS NFR BLD AUTO: 60 %
NRBC # BLD AUTO: 0 10E3/UL
NRBC BLD AUTO-RTO: 0 /100
P AXIS - MUSE: 56 DEGREES
PLATELET # BLD AUTO: 229 10E3/UL (ref 150–450)
POTASSIUM SERPL-SCNC: 4 MMOL/L (ref 3.4–5.3)
PR INTERVAL - MUSE: 154 MS
QRS DURATION - MUSE: 96 MS
QT - MUSE: 356 MS
QTC - MUSE: 440 MS
R AXIS - MUSE: 22 DEGREES
RBC # BLD AUTO: 5.06 10E6/UL (ref 4.4–5.9)
SODIUM SERPL-SCNC: 133 MMOL/L (ref 135–145)
SYSTOLIC BLOOD PRESSURE - MUSE: NORMAL MMHG
T AXIS - MUSE: 28 DEGREES
VENTRICULAR RATE- MUSE: 92 BPM
WBC # BLD AUTO: 5.9 10E3/UL (ref 4–11)

## 2024-01-29 PROCEDURE — 99223 1ST HOSP IP/OBS HIGH 75: CPT | Performed by: PHYSICIAN ASSISTANT

## 2024-01-29 PROCEDURE — 250N000013 HC RX MED GY IP 250 OP 250 PS 637: Performed by: BEHAVIOR TECHNICIAN

## 2024-01-29 PROCEDURE — G0378 HOSPITAL OBSERVATION PER HR: HCPCS

## 2024-01-29 PROCEDURE — 85025 COMPLETE CBC W/AUTO DIFF WBC: CPT | Performed by: BEHAVIOR TECHNICIAN

## 2024-01-29 PROCEDURE — 70553 MRI BRAIN STEM W/O & W/DYE: CPT

## 2024-01-29 PROCEDURE — 96361 HYDRATE IV INFUSION ADD-ON: CPT

## 2024-01-29 PROCEDURE — 99285 EMERGENCY DEPT VISIT HI MDM: CPT | Mod: 25

## 2024-01-29 PROCEDURE — 250N000013 HC RX MED GY IP 250 OP 250 PS 637: Performed by: PHYSICIAN ASSISTANT

## 2024-01-29 PROCEDURE — 70450 CT HEAD/BRAIN W/O DYE: CPT

## 2024-01-29 PROCEDURE — 80048 BASIC METABOLIC PNL TOTAL CA: CPT | Performed by: BEHAVIOR TECHNICIAN

## 2024-01-29 PROCEDURE — 255N000002 HC RX 255 OP 636: Performed by: EMERGENCY MEDICINE

## 2024-01-29 PROCEDURE — 93005 ELECTROCARDIOGRAM TRACING: CPT

## 2024-01-29 PROCEDURE — 250N000013 HC RX MED GY IP 250 OP 250 PS 637: Performed by: EMERGENCY MEDICINE

## 2024-01-29 PROCEDURE — 258N000003 HC RX IP 258 OP 636: Performed by: PHYSICIAN ASSISTANT

## 2024-01-29 PROCEDURE — A9585 GADOBUTROL INJECTION: HCPCS | Performed by: EMERGENCY MEDICINE

## 2024-01-29 PROCEDURE — 96360 HYDRATION IV INFUSION INIT: CPT

## 2024-01-29 PROCEDURE — 99204 OFFICE O/P NEW MOD 45 MIN: CPT | Performed by: PHYSICIAN ASSISTANT

## 2024-01-29 PROCEDURE — 36415 COLL VENOUS BLD VENIPUNCTURE: CPT | Performed by: BEHAVIOR TECHNICIAN

## 2024-01-29 RX ORDER — CEPHALEXIN 500 MG/1
500 CAPSULE ORAL 2 TIMES DAILY
COMMUNITY
Start: 2024-01-24 | End: 2024-01-31

## 2024-01-29 RX ORDER — MECLIZINE HYDROCHLORIDE 25 MG/1
25 TABLET ORAL EVERY 6 HOURS SCHEDULED
Status: DISCONTINUED | OUTPATIENT
Start: 2024-01-29 | End: 2024-01-30 | Stop reason: HOSPADM

## 2024-01-29 RX ORDER — CEPHALEXIN 500 MG/1
500 CAPSULE ORAL 2 TIMES DAILY
Status: DISCONTINUED | OUTPATIENT
Start: 2024-01-29 | End: 2024-01-30 | Stop reason: HOSPADM

## 2024-01-29 RX ORDER — PROCHLORPERAZINE MALEATE 5 MG
10 TABLET ORAL EVERY 6 HOURS PRN
Status: DISCONTINUED | OUTPATIENT
Start: 2024-01-29 | End: 2024-01-30 | Stop reason: HOSPADM

## 2024-01-29 RX ORDER — SODIUM CHLORIDE, SODIUM LACTATE, POTASSIUM CHLORIDE, CALCIUM CHLORIDE 600; 310; 30; 20 MG/100ML; MG/100ML; MG/100ML; MG/100ML
INJECTION, SOLUTION INTRAVENOUS CONTINUOUS
Status: DISCONTINUED | OUTPATIENT
Start: 2024-01-29 | End: 2024-01-30

## 2024-01-29 RX ORDER — PROCHLORPERAZINE 25 MG
25 SUPPOSITORY, RECTAL RECTAL EVERY 12 HOURS PRN
Status: DISCONTINUED | OUTPATIENT
Start: 2024-01-29 | End: 2024-01-30 | Stop reason: HOSPADM

## 2024-01-29 RX ORDER — GADOBUTROL 604.72 MG/ML
14 INJECTION INTRAVENOUS ONCE
Status: COMPLETED | OUTPATIENT
Start: 2024-01-29 | End: 2024-01-29

## 2024-01-29 RX ORDER — ACETAMINOPHEN 325 MG/1
650 TABLET ORAL EVERY 4 HOURS PRN
Status: DISCONTINUED | OUTPATIENT
Start: 2024-01-29 | End: 2024-01-30 | Stop reason: HOSPADM

## 2024-01-29 RX ORDER — DIAZEPAM 2 MG
2 TABLET ORAL EVERY 6 HOURS PRN
Status: DISCONTINUED | OUTPATIENT
Start: 2024-01-29 | End: 2024-01-30 | Stop reason: HOSPADM

## 2024-01-29 RX ORDER — ONDANSETRON 4 MG/1
4 TABLET, ORALLY DISINTEGRATING ORAL EVERY 6 HOURS PRN
Status: DISCONTINUED | OUTPATIENT
Start: 2024-01-29 | End: 2024-01-30 | Stop reason: HOSPADM

## 2024-01-29 RX ORDER — ONDANSETRON 2 MG/ML
4 INJECTION INTRAMUSCULAR; INTRAVENOUS EVERY 6 HOURS PRN
Status: DISCONTINUED | OUTPATIENT
Start: 2024-01-29 | End: 2024-01-30 | Stop reason: HOSPADM

## 2024-01-29 RX ORDER — AMOXICILLIN 250 MG
2 CAPSULE ORAL 2 TIMES DAILY PRN
Status: DISCONTINUED | OUTPATIENT
Start: 2024-01-29 | End: 2024-01-30 | Stop reason: HOSPADM

## 2024-01-29 RX ORDER — KETOROLAC TROMETHAMINE 30 MG/ML
30 INJECTION, SOLUTION INTRAMUSCULAR; INTRAVENOUS EVERY 6 HOURS PRN
Status: DISCONTINUED | OUTPATIENT
Start: 2024-01-29 | End: 2024-01-30 | Stop reason: HOSPADM

## 2024-01-29 RX ORDER — LORAZEPAM 1 MG/1
1 TABLET ORAL ONCE
Status: COMPLETED | OUTPATIENT
Start: 2024-01-29 | End: 2024-01-29

## 2024-01-29 RX ORDER — GADOBUTROL 604.72 MG/ML
14 INJECTION INTRAVENOUS ONCE
Status: DISCONTINUED | OUTPATIENT
Start: 2024-01-29 | End: 2024-01-29

## 2024-01-29 RX ORDER — AMOXICILLIN 250 MG
1 CAPSULE ORAL 2 TIMES DAILY PRN
Status: DISCONTINUED | OUTPATIENT
Start: 2024-01-29 | End: 2024-01-30 | Stop reason: HOSPADM

## 2024-01-29 RX ORDER — MECLIZINE HYDROCHLORIDE 25 MG/1
25 TABLET ORAL ONCE
Status: COMPLETED | OUTPATIENT
Start: 2024-01-29 | End: 2024-01-29

## 2024-01-29 RX ADMIN — LORAZEPAM 1 MG: 1 TABLET ORAL at 16:11

## 2024-01-29 RX ADMIN — MECLIZINE HYDROCHLORIDE 25 MG: 25 TABLET ORAL at 20:13

## 2024-01-29 RX ADMIN — CEPHALEXIN 500 MG: 500 CAPSULE ORAL at 20:12

## 2024-01-29 RX ADMIN — GADOBUTROL 14 ML: 604.72 INJECTION INTRAVENOUS at 16:54

## 2024-01-29 RX ADMIN — MECLIZINE HYDROCHLORIDE 25 MG: 25 TABLET ORAL at 10:36

## 2024-01-29 RX ADMIN — SODIUM CHLORIDE, POTASSIUM CHLORIDE, SODIUM LACTATE AND CALCIUM CHLORIDE: 600; 310; 30; 20 INJECTION, SOLUTION INTRAVENOUS at 20:11

## 2024-01-29 ASSESSMENT — ACTIVITIES OF DAILY LIVING (ADL)
ADLS_ACUITY_SCORE: 35
ADLS_ACUITY_SCORE: 19
ADLS_ACUITY_SCORE: 35
ADLS_ACUITY_SCORE: 35
ADLS_ACUITY_SCORE: 19

## 2024-01-29 NOTE — CONSULTS
Neurosurgery Consult    HPI    Mr. Orta is a 23-year-old male who presented to the emergency department today due to an episode of dizziness and feeling like his eyes were having difficulty tracking this morning at work, one of his coworkers saw him and felt he should be evaluated, he also mentioned maybe his head was shaking somewhat.    He denies any postictal like confusion after this episode passed.    He says this has happened several times in the last few months since he hit his head on Thanksgiving.    He has been into his primary care into urgent care and ER since then, and today underwent a head CT scan which showed an arachnoid cyst on the right in the temporal region.    Patient has no other prior brain imaging to review.    He denies weakness in his upper or lower extremities, denies numbness or tingling in his hands.    Medical history  No significant medical history    Social history  Works as an       B/P: 137/83, T: 97.7, P: 74, R: 16       Exam    Alert and oriented no acute distress  Moving all extremities  Finger-nose slow and accurate  Negative pronator drift  Extraocular movements intact  pupils equal and reactive        Imaging    Head CT scan demonstrated    IMPRESSION:   1. No acute intracranial pathology.   2. Right anterior temporal arachnoid cyst with associated mass effect.  4.6 x 2.6 cm right anterior temporal arachnoid cyst  with associated mass effect.    Assessment    Likely incidental right anterior temporal arachnoid cyst  Intermittent symptoms of dizziness    Plan:      We would recommend the patient obtain a brain MRI with and without contrast, this can be attained either as an inpatient or outpatient, the family would like to do it while in the ER, and the ER is planning to perform this.    Please contact neurosurgery with any concerning findings on the imaging, otherwise patient could also follow-up with our clinic as an outpatient.    Evaluation with neurology at  some point may be helpful as well.    Reviewed with Dr. Rush

## 2024-01-29 NOTE — LETTER
[facility]  [address] [place] [address]  Phone: [phone]  Fax: [phone]    January 30, 2024        David Orta  [address]  [place] [address]          To whom it may concern:    RE: David DUENAS You    Patient was admitted to [facility] on 1/29/024 and discharged on 1/30/2024. Patient may return to work on 1/2/2024.    Please contact me for questions or concerns.      Sincerely,          Ten Martino MD

## 2024-01-29 NOTE — ED PROVIDER NOTES
ED ATTENDING PHYSICIAN NOTE:   I evaluated this patient in conjunction with Vazquez Cruz PA-C  I have participated in the care of the patient and personally performed key elements of the history, exam, and medical decision making.     HPI:   David Orta is a 23 year old male presenting with constant unsteady dizziness that onset today. Patient reports head trauma in late November that he was never evaluated for. He was experiencing chest pain and room spinning dizziness and was seen in the ED on 12/16/23. He followed up with his PCP, but they felt no additional testing was necessary unless his symptoms worsened. He has experienced dizziness almost everyday since then. His symptoms are sometimes more mild. He was experiencing dizziness yesterday, but this resolved after taking a nap. This morning, the patient was driving to work and felt he had to concentrate to stay in the correct dilan. He felt his eyes were going somewhere else. He walked up stairs once he got to work, but began to feel dizzy, tachycardic, and faint. With the current episode, he is not experiencing chest pain. He is unable to focus. He is eating and drinking normally. He endorses tingling in his head yesterday. Denies fever or vision changes. Patient has a family history of heart problems and diabetes. The patient is not currently taking any regular medications. No recent smoking or alcohol use. Of note,  had a recent infection. He was taking antibiotics that started Wednesday. His symptoms are improving.     Independent Historian:   Patient's mother present and provided partial history.     Review of External Notes: Reviewed ER visit in December     EXAM:   General:  Alert, interactive  Cardiovascular:  Well perfused  Lungs:  No respiratory distress, no accessory muscle use  Neuro:  Moving all 4 extremities  MENTAL STATUS: A/O x 4, speech is fluent and spontaneous.  CRANIAL NERVES: CN II: No visual field deficits. Pupils are reactive to  light. III, IV, VI: Extraocular muscles are intact. V: Facial sensation is equal bilaterally. VII: Eyebrow raise and smile are equal bilaterally. IX and X: Palate elevation is equal. XI: Shoulder shrug is equal. XII: Tongue protrusion without deviation.  SENSORY: Sensation is intact to light touch.   MOTOR: No Pronator drift. No atrophy. Normal muscle tone. 5/5 MS in the UE and LE bilat.  CEREBELLAR: Intact finger-to-nose  Patient is able to ambulate without assistance.  No ataxia noted.  Skin:  Warm, dry  Psych:  Normal affect      EKG:   ECG results from 01/29/24   EKG 12-lead, tracing only     Value    Systolic Blood Pressure     Diastolic Blood Pressure     Ventricular Rate 92    Atrial Rate 92    AL Interval 154    QRS Duration 96        QTc 440    P Axis 56    R AXIS 22    T Axis 28    Interpretation ECG      Sinus rhythm  Normal ECG  When compared with ECG of 16-DEC-2023 19:30,  No significant change was found       Independent Interpretation (X-rays, CTs, rhythm strip):  I independently interpreted the head CT, right anterior temporal arachnoid cyst noted.          MEDICAL DECISION MAKING/ASSESSMENT AND PLAN:   23-year-old male presenting today with unsteadiness, dizziness that worsened today.  Reportedly had a head injury in November reports having worsening dizziness over the last day.  No associated head pain.  He was seen in the ER a month ago for chest pain.  Was discharged home.  He followed up with primary care doctor.  Recently he has been treated for an eye infection he is currently on Keflex.  He reports no vision changes.  He is neurologically intact on exam.  CT of the head without contrast was ordered there was findings of a right anterior temporal arachnoid cyst with associated mass effect.  Neurosurgery was consulted.  Patient was seen and evaluated neurosurgery PA.  Recommended outpatient MRI of the brain.  After further discussion with patient and mother.  Mother insistent that they  need the MRI today and need answers in regards to the dizziness.  I explained to them at length that the workup in the ER is limited and they need to follow-up as an outpatient for further evaluation.  Explained to them that they will follow-up with neurosurgery as an outpatient for further imaging.  Mother adamant on having the MRI done while in the ER.  MRI of the brain was ordered.  She states that she is very concerned regarding the patient's dizziness and wants to find the source of the dizziness.  I did reiterate to them again that if the MRI is normal the plan is to discharge him with outpatient follow-up.  Mother states that they need further evaluation.  Hospitalist was consulted. Pending MRI brain and hospitalist recommendations.     4:23 PM:   Hospitalist PA has assessed the patient.  Will place under observation under hospitalist service.  Pending MRI results at this time.    DIAGNOSIS:     ICD-10-CM    1. Arachnoid cyst  G93.0       2. Dizziness  R42                DISPOSITION:   Observation to hospitalist service     Scribe Disclosure:  I, Suzie Cabrera, am serving as a scribe at 12:14 PM on 1/29/2024 to document services personally performed by Stephy Aquino DO based on my observations and the provider's statements to me.   1/29/2024  [facility] EMERGENCY DEPT       Stpehy Aquino DO  01/29/24 1600       Stephy Aquino DO  01/29/24 1626

## 2024-01-29 NOTE — ED TRIAGE NOTES
C/O mild dizziness yesterday. Today sudden onset dizziness worsening this morning. Pt reports sx happened prior and seen in ED without diagnosis. Pt also reports shaking. Denies Cp, SOB, HA, N/V/D or further sx. ABC in tact. A/Ox4     Triage Assessment (Adult)       Row Name 01/29/24 0947          Triage Assessment    Airway WDL WDL        Respiratory WDL    Respiratory WDL WDL        Skin Circulation/Temperature WDL    Skin Circulation/Temperature WDL WDL        Cardiac WDL    Cardiac WDL X  dizzy        Peripheral/Neurovascular WDL    Peripheral Neurovascular WDL WDL        Cognitive/Neuro/Behavioral WDL    Cognitive/Neuro/Behavioral WDL WDL

## 2024-01-29 NOTE — ED PROVIDER NOTES
History     Chief Complaint:  Dizziness       HPI   David Orta is a 23 year old male presents to the ED for dizziness.  Patient states that he was driving to work this morning he felt dizzy, blurry vision and when he got out of the car he felt unsteady.  Denies any falls.  Patient states he had bumped his head around the end of November.  Patient states that symptoms have started around then.  He has a couple episodes of dizziness, vomiting, unsteadiness often.  Mom states that they have been in to primary care and ED to address the issue along with chest pressure that he had at the time.  He has had multiple EKGs that showed sinus rhythm.  He has no other history of TBI's, seizure.  Patient plays volleyball and golf.  Patient denies chest pain, shortness of breath.  Patient has never had imaging of the head.    Independent Historian:    Patient and mom.    Review of External Notes:  Reviewed office visit note from 12/26/2023     Medications:    vitamin D3 (CHOLECALCIFEROL) 1.25 MG (91353 UT) capsule        Past Medical History:    No past medical history on file.    Past Surgical History:    Past Surgical History:   Procedure Laterality Date    LAPAROSCOPIC APPENDECTOMY N/A 04/13/2015    Procedure: LAPAROSCOPIC APPENDECTOMY;  Surgeon: Kapil Griffin MD;  Location: [facility] OR          Physical Exam   Patient Vitals for the past 24 hrs:   BP Temp Pulse Resp SpO2 Weight   01/29/24 1600 (!) 139/90 -- 93 -- 97 % --   01/29/24 1550 (!) 142/90 -- 103 -- 97 % --   01/29/24 1520 (!) 140/85 -- 85 -- 98 % --   01/29/24 1510 (!) 140/85 -- 75 -- 99 % --   01/29/24 1500 (!) 145/89 -- 78 -- 100 % --   01/29/24 1450 (!) 149/84 -- 82 -- 99 % --   01/29/24 1430 (!) 141/80 -- 76 -- 97 % --   01/29/24 1420 135/80 -- 71 -- 100 % --   01/29/24 1400 135/81 -- 75 -- 100 % --   01/29/24 1340 137/84 -- 66 -- 100 % --   01/29/24 1330 138/83 -- 69 -- 99 % --   01/29/24 1320 137/83 -- 74 -- 100 % --   01/29/24 1300 133/82 -- 71 --  99 % --   01/29/24 1253 -- -- 71 -- 100 % --   01/29/24 1249 130/82 -- 71 -- -- --   01/29/24 1240 136/86 -- 71 -- -- --   01/29/24 1230 132/81 -- 70 16 -- --   01/29/24 1220 137/86 -- 76 -- -- --   01/29/24 1200 126/78 -- 72 -- -- --   01/29/24 1150 125/83 -- 75 -- -- --   01/29/24 0949 (!) 145/92 -- -- -- -- --   01/29/24 0946 -- 97.7  F (36.5  C) 93 18 100 % 139.7 kg (307 lb 15.7 oz)        Physical Exam  Physical Exam:  General: Well appearing, no acute distress  Head: normocephalic, atraumatic  Eyes: PERRLA, EOMI, horizontal nystagmus  CV: RRR, no murmur/gallop/rubs  Pulm: lungs clear to ausculation bilaterally, normal respiratory effort, normal chest expansion with breathing   Abdomen: soft, non-tender, non-distended, no rigidity or guarding, normal BS  MSK: No cervical tenderness, no midline tenderness  Ext: normal range of motion of all extremities. No tenderness to palpation, erythema, or deformity.   Skin: Warm, dry, no rashes  Neuro: A&O x3, normal speech. No focal neurologic deficits. Normal gait. Normal coordination. Muscle strength 5/5 bilaterally.  Negative Romberg.  Sensation is intact  Psych: Appropriate mood. Cooperative      Emergency Department Course   ECG  ECG results from 01/29/24   EKG 12-lead, tracing only     Value    Systolic Blood Pressure     Diastolic Blood Pressure     Ventricular Rate 92    Atrial Rate 92    DC Interval 154    QRS Duration 96        QTc 440    P Axis 56    R AXIS 22    T Axis 28    Interpretation ECG      Sinus rhythm  Normal ECG  When compared with ECG of 16-DEC-2023 19:30,  No significant change was found           Imaging:  CT Head w/o Contrast   Final Result   IMPRESSION:    1. No acute intracranial pathology.    2. Right anterior temporal arachnoid cyst with associated mass effect.      FARRAH FELICIANO MD            SYSTEM ID:  [id number]      MR Brain w/o & w Contrast    (Results Pending)     Report per radiology    Laboratory:  Labs Ordered and Resulted  from Time of ED Arrival to Time of ED Departure   BASIC METABOLIC PANEL - Abnormal       Result Value    Sodium 133 (*)     Potassium 4.0      Chloride 98      Carbon Dioxide (CO2) 26      Anion Gap 9      Urea Nitrogen 9.6      Creatinine 0.70      GFR Estimate >90      Calcium 9.2      Glucose 101 (*)    CBC WITH PLATELETS AND DIFFERENTIAL    WBC Count 5.9      RBC Count 5.06      Hemoglobin 14.9      Hematocrit 44.1      MCV 87      MCH 29.4      MCHC 33.8      RDW 12.0      Platelet Count 229      % Neutrophils 60      % Lymphocytes 27      % Monocytes 7      % Eosinophils 4      % Basophils 1      % Immature Granulocytes 1      NRBCs per 100 WBC 0      Absolute Neutrophils 3.6      Absolute Lymphocytes 1.6      Absolute Monocytes 0.4      Absolute Eosinophils 0.2      Absolute Basophils 0.1      Absolute Immature Granulocytes 0.0      Absolute NRBCs 0.0          Procedures   none    Emergency Department Course & Assessments:             Interventions:  Medications   gadobutrol (GADAVIST) injection 14 mL (has no administration in time range)   gadobutrol (GADAVIST) injection 14 mL (has no administration in time range)   meclizine (ANTIVERT) tablet 25 mg (25 mg Oral $Given 1/29/24 1036)   LORazepam (ATIVAN) tablet 1 mg (1 mg Oral $Given 1/29/24 1611)         Independent Interpretation (X-rays, CTs, rhythm strip):  None    Consultations/Discussion of Management or Tests:  ED Course as of 01/29/24 1623   Mon Jan 29, 2024   1027 I did my initial evaluation with the patient, and discussed ddx, and plan with Dr. Aquino.     1123 Reviewed CT which shows right anterior temporal arachnoid cyst with associated mass effect as well as bilateral maxillary sinus cysts.   1217 Discussed CT results with patient. Informed them that I will talk to neurosurgery regarding plan.   1442 Dr. Aquino rechecked and updated the patient.      1459 ALY Aguilar from neurosurgery came down as I was in the room with the patient.  Discussed CT  findings.  He states that CT findings are not acute and likely something that has been there for a while.  He recommends MRI outpatient.    1508 Spoke with Verónica miranda regarding possible admission.  Ordered MRI of the head.  She will come down and talk to patient then decide plan.   1521 Updated the patient regarding plan.       Social Determinants of Health affecting care:  none     Disposition:  The patient was admitted to the hospital under the care of Dr. Akins.     Impression & Plan    CMS Diagnoses: None      Medical Decision Making:  Patient presents as stated above.  Differential includes but is not limited to concussion, brain mass, stroke, arrhythmia.  Patient had a normal neuroexam.  Normal gait.  Normal coordination.  He states that he has been having symptoms for a while.  He was seen in the ED for chest pressure/pain in December.  He had a normal EKG at that time.  He then followed with primary care for a physical. He mentioned having lightheadedness and intermittent headaches while at the visit.  He had no red flags on history or exam.  Primary did not believe there were any indications for further imaging at the time. Patient has never had a traumatic brain injury, seizure in the past.  Patient states that he bumped his head on a shelf couple months ago.  Did not have significant pain at the time.  No signs of bleeding.  Mom really wants to pursue imaging for further evaluation.  This is reasonable.  Obtained a CT of head and basic labs.  CT showed 4.6 cm by 2.6 cm right anterior temporal arachnoid cyst with associated mass effect.  Consulted neurosurgery.  Neurosurgery does not believe the cyst is causing the patient's symptoms.  This is likely a chronic incidental finding.  Neurosurgery recommended following up with MRI outpatient.  However mom would like the patient to be admitted for further evaluation and MRI.  Explained that based on history, exam findings, neurosurgery consult there is no  acute indication for an urgent MRI.  Both patient and mom were presented on getting an MRI today to look for any other causes of the patient's symptoms.  Neurosurgery said that it was fine to get the MRI today.  Patient is also endorsing persistent dizziness.  Consulted Verónica Whipple for admission.  She does not believe there is indication for admission at this time when he was feeling better with the meclizine.  She states that she will come down and talk to the patient.  MRI of head with and without contrast was ordered.  ALY Atkins accepting admission on behalf of Dr. Akins.    Plan is to do admit patient into observation. Pending MRI results.    Diagnosis:    ICD-10-CM    1. Arachnoid cyst  G93.0       2. Dizziness  R42            Discharge Medications:  New Prescriptions    No medications on file          Vazquez Cruz PA-C  1/29/2024   Stephy Aquino DO Jama, Kausar, PA-C  01/29/24 1623

## 2024-01-30 ENCOUNTER — APPOINTMENT (OUTPATIENT)
Dept: PHYSICAL THERAPY | Facility: CLINIC | Age: 24
End: 2024-01-30
Attending: PHYSICIAN ASSISTANT
Payer: COMMERCIAL

## 2024-01-30 VITALS
DIASTOLIC BLOOD PRESSURE: 81 MMHG | HEIGHT: 74 IN | RESPIRATION RATE: 18 BRPM | OXYGEN SATURATION: 96 % | TEMPERATURE: 98.8 F | WEIGHT: 292.99 LBS | SYSTOLIC BLOOD PRESSURE: 132 MMHG | HEART RATE: 62 BPM | BODY MASS INDEX: 37.6 KG/M2

## 2024-01-30 LAB
ANION GAP SERPL CALCULATED.3IONS-SCNC: 7 MMOL/L (ref 7–15)
BUN SERPL-MCNC: 12.3 MG/DL (ref 6–20)
CALCIUM SERPL-MCNC: 9.2 MG/DL (ref 8.6–10)
CHLORIDE SERPL-SCNC: 104 MMOL/L (ref 98–107)
CREAT SERPL-MCNC: 0.78 MG/DL (ref 0.67–1.17)
DEPRECATED HCO3 PLAS-SCNC: 28 MMOL/L (ref 22–29)
EGFRCR SERPLBLD CKD-EPI 2021: >90 ML/MIN/1.73M2
GLUCOSE SERPL-MCNC: 101 MG/DL (ref 70–99)
POTASSIUM SERPL-SCNC: 4.2 MMOL/L (ref 3.4–5.3)
SODIUM SERPL-SCNC: 139 MMOL/L (ref 135–145)

## 2024-01-30 PROCEDURE — 250N000013 HC RX MED GY IP 250 OP 250 PS 637: Performed by: PHYSICIAN ASSISTANT

## 2024-01-30 PROCEDURE — G0378 HOSPITAL OBSERVATION PER HR: HCPCS

## 2024-01-30 PROCEDURE — 99231 SBSQ HOSP IP/OBS SF/LOW 25: CPT | Performed by: NURSE PRACTITIONER

## 2024-01-30 PROCEDURE — 97161 PT EVAL LOW COMPLEX 20 MIN: CPT | Mod: GP

## 2024-01-30 PROCEDURE — 36415 COLL VENOUS BLD VENIPUNCTURE: CPT | Performed by: PHYSICIAN ASSISTANT

## 2024-01-30 PROCEDURE — 96361 HYDRATE IV INFUSION ADD-ON: CPT

## 2024-01-30 PROCEDURE — 80048 BASIC METABOLIC PNL TOTAL CA: CPT | Performed by: PHYSICIAN ASSISTANT

## 2024-01-30 PROCEDURE — 99239 HOSP IP/OBS DSCHRG MGMT >30: CPT | Performed by: INTERNAL MEDICINE

## 2024-01-30 PROCEDURE — 258N000003 HC RX IP 258 OP 636: Performed by: PHYSICIAN ASSISTANT

## 2024-01-30 PROCEDURE — 97530 THERAPEUTIC ACTIVITIES: CPT | Mod: GP

## 2024-01-30 RX ORDER — MECLIZINE HYDROCHLORIDE 25 MG/1
25 TABLET ORAL 3 TIMES DAILY PRN
Qty: 30 TABLET | Refills: 0 | Status: SHIPPED | OUTPATIENT
Start: 2024-01-30 | End: 2024-03-18

## 2024-01-30 RX ORDER — HYDROXYZINE HYDROCHLORIDE 25 MG/1
25 TABLET, FILM COATED ORAL 3 TIMES DAILY PRN
Qty: 30 TABLET | Refills: 0 | Status: SHIPPED | OUTPATIENT
Start: 2024-01-30 | End: 2024-03-18

## 2024-01-30 RX ORDER — HYDROXYZINE HYDROCHLORIDE 25 MG/1
25 TABLET, FILM COATED ORAL 3 TIMES DAILY PRN
Status: DISCONTINUED | OUTPATIENT
Start: 2024-01-30 | End: 2024-01-30 | Stop reason: HOSPADM

## 2024-01-30 RX ADMIN — CEPHALEXIN 500 MG: 500 CAPSULE ORAL at 09:14

## 2024-01-30 RX ADMIN — MECLIZINE HYDROCHLORIDE 25 MG: 25 TABLET ORAL at 04:03

## 2024-01-30 RX ADMIN — SODIUM CHLORIDE, POTASSIUM CHLORIDE, SODIUM LACTATE AND CALCIUM CHLORIDE: 600; 310; 30; 20 INJECTION, SOLUTION INTRAVENOUS at 04:49

## 2024-01-30 ASSESSMENT — ACTIVITIES OF DAILY LIVING (ADL)
ADLS_ACUITY_SCORE: 19

## 2024-01-30 NOTE — PROGRESS NOTES
01/30/24 1415   Appointment Info   Signing Clinician's Name / Credentials (PT) Asiya Zendejas DPT   Quick Adds   Quick Adds Vestibular Eval   Living Environment   People in Home parent(s)   Current Living Arrangements house   Living Environment Comments Pt lives with his parents within house, stairs within.   Self-Care   Usual Activity Tolerance good   Current Activity Tolerance moderate   Regular Exercise No   Equipment Currently Used at Home none   Fall history within last six months no   Activity/Exercise/Self-Care Comment Pt reports IND at baseline, works as .   General Information   Onset of Illness/Injury or Date of Surgery 01/29/24   Referring Physician Mckenzie Cruz PA-C   Patient/Family Therapy Goals Statement (PT) get rid of dizziness   Pertinent History of Current Problem (include personal factors and/or comorbidities that impact the POC) David Orta is a 23 year old male with no significant medical history who presents to the ED on 1/29/2024 for evaluation of recurrent episodes of dizziness.     ED workup reveals: intermittently hypertensive otherwise VSS, not orthostatic with laying to standing blood pressures, room of 133, glucose of 101, CBC WNL, EKG shows rate of 92 bp min SR, CT of head shows no acute intracranial pathology and right anterior temporal arachnoid cyst with associated mass effect, and MRI brain shows no acute infarct, mass, mass effect, or hemorrhage, mild arachnoid cyst anterior aspect of right middle cranial fossa. Pt reports onset of symptoms ~1 month ago, but worsened where felt constantly dizzy for full day leading to hospital admission. Pt reports difficulty with fixating with vision, noting L vision more so. Noting smyptoms with quick turns and position changes as well.   Existing Precautions/Restrictions fall   Cognition   Affect/Mental Status (Cognition) WNL   Orientation Status (Cognition) oriented x 4   Follows Commands (Cognition) WNL   Pain  Assessment   Patient Currently in Pain Yes, see Vital Sign flowsheet   Integumentary/Edema   Integumentary/Edema no deficits were identifed   Posture    Posture Not impaired   Range of Motion (ROM)   Range of Motion ROM is WNL   Strength (Manual Muscle Testing)   Strength (Manual Muscle Testing) strength is WNL   Bed Mobility   Comment, (Bed Mobility) supine<>sit SBA   Transfers   Comment, (Transfers) sit<>stand SBA   Gait/Stairs (Locomotion)   Comment, (Gait/Stairs) amb SBA without AD   Balance   Balance Comments SBA d/t pt feeling unsteady, no overt LOB   Sensory Examination   Sensory Perception patient reports no sensory changes   Cervicogenic Screen   Neck ROM WNL   Vertebral Artery Test Normal   Alar Ligament Test Normal   Oculomotor Exam   Ocular ROM Normal   Smooth Pursuit Abnormal   Smooth Pursuit Comment difficulty with L gaze, nearing end range, mild nystagmus x 2 beats notes   Saccades Normal   VOR Normal   Head Impulse Test Corrective Saccade R head thrust   Convergence Testing Normal   Infrared Goggle Exam or Frenzel Lense Exam   Exam completed with Frenzel Lenses   Spontaneous Nystagmus Negative   Gaze Evoked Nystagmus Horizontal L   Gaze Evoked Nystagmus Comments 2 beats   Positional testing Negative   Casper-Hallpike (Right) Negative   Casper-Hallpike (Left) Negative   HSCC Supine Roll Test (Right) Negative   HSCC Supine Roll Test (Left) Negative   Clinical Impression   Criteria for Skilled Therapeutic Intervention Yes, treatment indicated   PT Diagnosis (PT) dizziness   Influenced by the following impairments dizziness, impaired balance   Functional limitations due to impairments difficulty with ambulation and mobility   Clinical Presentation (PT Evaluation Complexity) stable   Clinical Presentation Rationale clinical judgement   Clinical Decision Making (Complexity) low complexity   Planned Therapy Interventions (PT) balance training;bed mobility training;gait training;home exercise program;neuromuscular  re-education;patient/family education;stair training;strengthening;transfer training;progressive activity/exercise;risk factor education;home program guidelines   Risk & Benefits of therapy have been explained evaluation/treatment results reviewed;care plan/treatment goals reviewed;risks/benefits reviewed;current/potential barriers reviewed;participants voiced agreement with care plan;participants included;patient;mother   PT Total Evaluation Time   PT Eval, Low Complexity Minutes ([id number]) 14   Physical Therapy Goals   PT Frequency Daily   PT Predicted Duration/Target Date for Goal Attainment 01/31/24   PT Goals Bed Mobility;Transfers;Gait;Stairs   PT: Bed Mobility Supervision/stand-by assist;Supine to/from sit;Goal Met   PT: Transfers Supervision/stand-by assist;Sit to/from stand;Goal Met   PT: Gait Supervision/stand-by assist;Greater than 200 feet   PT: Stairs Supervision/stand-by assist;Greater than 10 stairs;Rail on right   PT Discharge Planning   PT Plan PT: reassess vestibular as indicated, see neuro notes   PT Discharge Recommendation (DC Rec) home with outpatient physical therapy   PT Rationale for DC Rec Patient presents below baseline functional mobility. Pt is highly IND at baseline and works as an . Pt reports onset of intermittent dizziness 1 month ago, increasing in severity over last few days. Pt largest complaint is difficulty fixating with vision, noting this most with fast turns/sitting up quickly. Negative for positional testing, but did have positive R head thrust with corrective saccade and noted nystagmus with L gaze and difficulty tracking end L gaze. Anticipate pt could return home with SBA as needed for safety for mobility. Rec follow up with OP vestibular therapy for follow up.   PT Brief overview of current status SBA   Total Session Time   Total Session Time (sum of timed and untimed services) 14

## 2024-01-30 NOTE — PROGRESS NOTES
Neurosurgery Progress Note:     Mr. Orta is a 23-year-old male who presented to the emergency department today due to an episode of dizziness and feeling like his eyes were having difficulty tracking this morning at work, one of his coworkers saw him and felt he should be evaluated, he also mentioned maybe his head was shaking somewhat.  Right anterior temporal arachnoid cyst noted on CT scan    24 hours events: No events overnight.  Exam stable.  Plan for neurology consult today.     On exam: Patient is alert and oriented moving all extremities with 5 out of 5 strength.  Pupils equal and reactive bilaterally.  EOM intact.  No ataxia on exam.  No pronator drift on exam.  Patient reports being lightheaded/dizzy.  He also reports visual changes left eye worse than right eye       PLAN:  -Likely incidental right anterior temporal arachnoid cyst  -Would recommend neurology consult for workup of symptoms.  -No outpatient neurosurgery follow-up required  -Neurosurgery to sign off.  Please reconsult if future needs arise.  I will plan to return to the room today and notify the patient and his mother of our plan.     Jose Eduardo Palacio DNP  [facility] Neurosurgery  [address] [id number] [phone]    -Plan discussed with Dr. Rush  -I spent 25 minutes providing care for this patient.

## 2024-01-30 NOTE — CONSULTS
Neurology Consult Note  [facility]       [2024]                                                                                       Admission Date: 2024  Hospital Day: 2      Patient: David Orta      : 2000  MRN:  [id number]     CC:      Chief Complaint   Patient presents with    Dizziness       Consult Request:  Referring Provider:  Can Akins MD  Primary Care Provider:  Venu Guzman MD        HPI:  David Orta is a 23 year old yo male admitted for dizziness that has built up in the last month or so.  He tells me that he started having dizziness about a month ago, and these are episodes of feeling unsteady, blurred vision, accompanied by a sense of anxiety, chest tightness, occasional shortness of breath, and numbness and tingling in his fingertips.  Tells me that he had progressively increasing episodes, with the first few episodes of being brief, with a maximum being about 3 to 4 hours.  He states that in the last week he only had 1 episode, and that was about a couple of hours long.    On , 2024 he woke up with a long episode of dizziness, that lasted basically the whole day.  He says that he was anxious the whole day as well.  He went to bed, and woke up feeling somewhat better, although still anxious.  As he drove to work, he started feeling progressively dizzier, and started having difficulty in coordinating his drive.  When he got to work, he told his coworker, and after trying a couple of remedies, came to the ER.    Since being in the hospital he is felt somewhat better, and feels very minimal dizziness if at all.    At no point did he have any vertigo, any sensation of nausea or vomiting, headaches, or other neurological symptoms.    His mother is with him, and they are both very worried about his MRI results.    PAST MEDICAL HISTORY:  ALLERGIES:   Allergies   Allergen Reactions    Cats      Tobacco:    History  "  Smoking Status    Never   Smokeless Tobacco    Never     Alcohol:  Social History    Substance and Sexual Activity      Alcohol use: No    MEDICATIONS:       CURRENTLY SCHEDULED MEDICATIONS    cephALEXin  500 mg Oral BID    meclizine  25 mg Oral Q6H [facility]          HOME MEDICATIONS  Medications Prior to Admission   Medication Sig Dispense Refill Last Dose    cephALEXin (KEFLEX) 500 MG capsule Take 500 mg by mouth 2 times daily   2024 at x2    vitamin D3 (CHOLECALCIFEROL) 1.25 MG (01858 UT) capsule Take 1 capsule (50,000 Units) by mouth every 7 days 12 capsule 0 2024 at ?     MEDICAL HISTORY  No past medical history on file.  SURGICAL HISTORY  Past Surgical History:   Procedure Laterality Date    LAPAROSCOPIC APPENDECTOMY N/A 2015    Procedure: LAPAROSCOPIC APPENDECTOMY;  Surgeon: Kapil Griffin MD;  Location:  OR     FAMILY HISTORY    Family History   Problem Relation Age of Onset    Diabetes Mother     Diabetes Father     Cerebrovascular Disease Maternal Aunt      SOCIAL HISTORY  Social History     Socioeconomic History    Marital status: Single   Occupational History    Occupation:    Tobacco Use    Smoking status: Never    Smokeless tobacco: Never   Substance and Sexual Activity    Alcohol use: No    Drug use: No    Sexual activity: Not Currently            Height: 188 cm (6' 2\")     Temp: 98.8  F (37.1  C)   Weight: 132.9 kg (292 lb 15.9 oz)    Temp src: Oral         BP: 132/81         Estimated body mass index is 37.62 kg/m  as calculated from the following:    Height as of this encounter: 1.88 m (6' 2\").    Weight as of this encounter: 132.9 kg (292 lb 15.9 oz).    Resp: 18   SpO2: 96 %   O2 Device: None (Room air)     Blood Pressure:   BP Readings from Last 3 Encounters:   24 132/81   23 128/86   23 128/86     T24 : Temp (24hrs), Av.2  F (36.8  C), Min:97.6  F (36.4  C), Max:98.8  F (37.1  C)           NEUROLOGICAL EXAMINATION:    Level of " Consciousness:  Normal.    Orientation:  Alert and oriented to time, place and person.    Memory:  Intact recent and remote memory.    Attention span and Concentration:  Unremarkable.    Language and Speech:  Normal (can name, repeat phrases, good spontaneous speech).    Fund of Knowledge:  Within acceptable range.     Cranial Nerves:  2,3,4,5,6,7,8,9,10,11,12 are unremarkable.     Sensory:  No overt sensory abnormalities to touch, pinprick and vibration and proprioception.      Motor:  No motor weakness.  Muscle strength, tone, bulk unremarkable.  No abnormal movements seen.  Fine motor skills are normal.      Coordination:  Coordination intact.  No clear ataxia or dysmetria on finger-nose-finger or heel-shin-toe testing.      Balance, Gait and Station:  Balance and gait intact.      Deep Tendon Reflexes:  DTR's (biceps, triceps, brachioradialis, knee jerks, ankle jerks) preserved and symmetric.      Plantar response:  Flexor bilaterally.       .    IMAGING RESULTS     Recent Results (from the past 24 hour(s))   MR Brain w/o & w Contrast    Narrative    EXAM: MR BRAIN W/O and W CONTRAST  LOCATION: [facility]  DATE: 1/29/2024    INDICATION: persistent dizziness, anterior temporal arachoid cyst  COMPARISON: None.  CONTRAST: 14mL Gadavist  TECHNIQUE: Routine multiplanar multisequence head MRI without and with intravenous contrast.    FINDINGS:  INTRACRANIAL CONTENTS: No acute or subacute infarct. Mild arachnoid cyst anterior aspect right middle cranial fossa. Normal brain parenchymal signal. Normal ventricles and sulci. Normal position of the cerebellar tonsils. No pathologic contrast   enhancement.    SELLA: No abnormality accounting for technique.    OSSEOUS STRUCTURES/SOFT TISSUES: Normal marrow signal. The major intracranial vascular flow voids are maintained.     ORBITS: No abnormality accounting for technique.     SINUSES/MASTOIDS: No paranasal sinus mucosal disease. No middle ear or  mastoid effusion.       Impression    IMPRESSION:  1.  No acute infarct, mass, mass effect, or hemorrhage.  2.  Mild arachnoid cyst anterior aspect right middle cranial fossa.               ASSESSMENT     Dizziness, nonspecific, likely persistent postural dizziness  Incidentally discovered arachnoid cyst  Anxiety    His dizziness is likely multifactorial, likely caused by a combination of some refractory errors, triggering anxiety, and possible persistent postural dizziness.  At this point, I do not see anything abnormal about his neurological examination, and think that he could be discharged with home therapy and long-term management of his anxiety as well.    His brain MRI shows an arachnoid cyst which is likely incidental.  I do not think it has any impact on his dizziness at all.  Would not recommend any surgical intervention.  Incidentally while I was in the room with the patient, his neurosurgery team visited as well and they had the same opinion.    After a long conversation, the family understood the plan of care, and would be comfortable following up as an outpatient.  I discussed this with his primary physician, Dr. Martino as well.        RECOMMENDATIONS   Physical therapy as outpatient  Anxiety management and refractory correction as outpatient  Neurology would be happy to see him as an outpatient if needed, but will sign off for now.         Eagle Bazan MD   Neurologist, [facility]   January 30, 2024 4:20 PM       A total time of 77 minutes was spent on the care of this patient on the date of the visit, outside of time spent performing any procedures. Please excuse any typographical errors, as this note was generated using a Voice Recognition Software.

## 2024-01-30 NOTE — PLAN OF CARE
Goal Outcome Evaluation:    Orientation: Alert and oriented x4.   VSS. 100% on RA. Orthostatic blood pressures stable.   LS: Clear and equal bilaterally.   GI: Patient is passing gas. No BM this shift. Denies N/V.   : Adequate urine output. Pt ambulating to the bathroom via SBA to void as needed.   Skin: WDL.   Activity: SBA. Pt steady on feet while ambulating this shift, SBA d/t intermittent dizziness and blurry vision. Pt slept comfortably throughout shift between cares.   Pain: 0/10. Pt denies any pain. Pt complains of intermittent dizziness and blurred vision primarily when going from sitting to standing.   Updates/Plan: Continue IVF. Monitor vital signs. Monitor intake and output. Monitor neuro status. PT vestibular consult ordered for today. Continue with current cares.

## 2024-01-30 NOTE — PLAN OF CARE
Goal Outcome Evaluation:    VSS. Pt rested comfortably between cares.   Resp: LS Clears, equal bilaterally  GI/: pt passing flatus, No BM this shift, Denies N/V. Tolerating regular diet. Voiding spontaneously without difficult.   IV: Removed at time of discharge.   Pain/Comfort: pt denies pain. Pt advised to notify RN if experiencing pain.   Skin: WDL   Activity: Pt ambulating to the bathroom SBA, and  complains of intermittent dizziness and blurry vision from siting to standing.     At time of discharge, went over discharge instructions and provided pt with prescription scripts provided from the provider to  home meds at their preffered pharmacy. Any questions and concerns addressed. Pt verbalized understanding of discharge instructions and will follow up with primary care provider. Pt left unit with mother.

## 2024-01-30 NOTE — PHARMACY-ADMISSION MEDICATION HISTORY
Pharmacist Admission Medication History    Admission medication history is complete. The information provided in this note is only as accurate as the sources available at the time of the update.    Information Source(s): Patient and CareEverywhere/SureScripts via in-person    Pertinent Information: -    Changes made to PTA medication list:  Added: all meds  Deleted: None  Changed: None      Allergies reviewed with patient and updates made in EHR: yes    Medication History Completed By: Jimi Wilson [facility] 1/29/2024 7:05 PM    Prior to Admission medications    Medication Sig Last Dose Taking? Auth Provider Long Term End Date   cephALEXin (KEFLEX) 500 MG capsule Take 500 mg by mouth 2 times daily 1/28/2024 at x2 Yes Unknown, Entered By History  1/31/24   vitamin D3 (CHOLECALCIFEROL) 1.25 MG (86382 UT) capsule Take 1 capsule (50,000 Units) by mouth every 7 days 1/26/2024 at ? Yes Venu Guzman MD

## 2024-01-30 NOTE — H&P
"[facility]    Hospitalist History and Physical    Name: David Orta    MRN: [id number]  YOB: 2000    Age: 23 year old  Date of Admission:  1/29/2024  Date of Service (when I saw the patient): 01/29/24    Assessment & Plan   David Orta is a 23 year old male with no significant medical history who presents to the ED on 1/29/2024 for evaluation of recurrent episodes of dizziness.    ED workup reveals: intermittently hypertensive otherwise VSS, not orthostatic with laying to standing blood pressures, room of 133, glucose of 101, CBC WNL, EKG shows rate of 92 bp min SR, CT of head shows no acute intracranial pathology and right anterior temporal arachnoid cyst with associated mass effect, and MRI brain shows no acute infarct, mass, mass effect, or hemorrhage, mild arachnoid cyst anterior aspect of right middle cranial fossa.    #Episodes of dizziness, presyncope, chest pain  #Recent head trauma with possible concussion: reports head trauma involving hitting his head on a metal shelf while working as an  in 11/2023 and since then has been have intermittent episodes of dizziness, headache (over forehead and temples), presyncope, and chest pain (describes more as \"soreness\" or ache\"). More severe episodes on 1/28 which lasted all day and then recurrence on 1/29 while driving to work where patient could not focus his vision to drive. He notes associated unsteady gait during episodes without focal weakness. He reports turning his head seems to aggravate the dizziness during the episodes, but not necessarily that it brings on the episodes. He did mention the room spinning with his dizziness not unsure if always present. He seems to get anxious with the frequency and severity of the episodes to the point it sounds as if he is hyperventilating. He does not a day where he was febrile to 102 with associated cough and congestion about 3 weeks ago and his symptoms resolved on " their own. No significant nystagmus noted on exam in ED. No history of vertigo. He was not initially evaluated after his head trauma for a concussion nor did he allow for rest after this. He notes mild improvement but not resolution of symptoms with Meclizine. MRI of brain negative for acute pathology contributing intracranially.   -etiology not entirely clear, sounds most consistent with vertigo, difficult to tell if BPPV versus vestibular neuritis with reports of likely recent viral infection 3 weeks ago, but also issues with a recent concussion could be possibly contributing as well as anxiety  -trial scheduled Meclizine 25 mg every 6 hours  -PRN Valium for dizziness  -IVF hydration with LR at 100 ml/hour  -PT consult for vestibular assessment   -complete orthostatic blood pressures  -could consider neurology assessment if above treatment plan not improving and patient still significant symptomatic     #Mild hyponatremia: initial sodium of 133, suspect mild dehydration  -IVF hydration with LR at 100 ml/hour  -recheck BMP in AM    #Incidental mild arachnoid cyst: initial CT of head in ED on 1/29 showed right anterior temporal arachnoid cyst with associated mass effect. ED provider discussed with neurosurgery, who also came evaluated the patient while in the ED, and felt symptoms were not due to this incidental finding.   -Neurosurgery recommended nonemergent MRI of luz, obtained on 1/29 showing no acute process and mild arachnoid cyst anterior of right middle cranial fossa     #Recent right eye infection: reportedly diagnosed by ophthalmologist and currently on PO Keflex for 7 days, continue     Clinically Significant Risk Factors Present on Admission                                DVT Prophylaxis: Ambulate every shift  Code Status: Full Code, discussed with patient  Disposition: Expected discharge in 24-48 hours, will admit to observation     Primary Care Physician   LUZ JONES    Chief Complaint  "  Dizziness    History obtained from discussion with ED provider, Travis Cruz PA-C, chart review, and interview with patient. The patient's mother is at the bedside and provides additional history.     History of Present Illness   David Orta is a 23 year old male who presents with episodes of dizziness.  The patient works as an .  The patient hit the top of his head at work on a metal shelf in 11/2023.  Since then he has been having issues with intermittent episodes of dizziness and what he seems to describe as lightheadedness, presyncopal symptoms. He is also been having episodes of chest discomfort for which he was seen in the emergency room in 12/2023 with reassuring workup including negative troponin, EKG, and chest x-ray.  The last 2 days the patient's had more severe episodes.  He reports dizziness to the point he has unsteady gait along with lightheadedness without loss of consciousness.  States after his symptoms start he starts to have heavy breathing as well as his \"heart aches\" and chest feels sore when pointing to the center of his chest.  He states he had an episode yesterday that lasted the whole day.  He had another episode today while driving where he had difficulty focusing his vision.  Once arrived to the job today he walked up 3 flights of stairs with his tools which made his dizziness worse and he had to hold onto a wall.  His coworker gave him a can of Dr. Pepper in case it was low blood sugar which did not help.  He states he felt \"not right\".  He had to have his coworker drive him to come in for evaluation since he could not drive.  He states in regards to his chest discomfort he has had random episodes like this for greater than a week.  Denies associated palpitations.  He states in regards to his dizziness that moving his head or getting up seems to make it worse.  At times he states the room is spinning but is unclear if he actually feels this sensation when reasked.  He " notes headaches over his forehead and temples.  He notes during the episodes he will pause with his speech but denies slurred speech or difficulty finding his words.  Denies associated hearing losses or unilateral weakness.  He has had tingling over his forehead with episodes but in no other locations.  He feels completely exhausted after the episodes and feels as if he needs to sleep.  Denies history of anxiety, migraine headaches, or vertigo.  He notes that 3 weeks ago he had a fever of 102 for about a day with associated cough and rhinorrhea that resolved on its own.  His mother expresses multiple times about her concern for these recurrent episodes and no answers as to why.  The patient has not previously been assessed for concussion nor was he on brain rest after previously hitting his head in case he did have a concussion.  He states he is currently on oral Keflex for a right eye infection per his ophthalmologist otherwise is not on any prescription medications.    Past Medical History    None    Past Surgical History   Past Surgical History:   Procedure Laterality Date    LAPAROSCOPIC APPENDECTOMY N/A 04/13/2015    Procedure: LAPAROSCOPIC APPENDECTOMY;  Surgeon: Kapil Griffin MD;  Location:  OR       Prior to Admission Medications   Prior to Admission Medications   Prescriptions Last Dose Informant Patient Reported? Taking?   cephALEXin (KEFLEX) 500 MG capsule   Yes Yes   Sig: Take 500 mg by mouth 2 times daily   vitamin D3 (CHOLECALCIFEROL) 1.25 MG (22967 UT) capsule   No Yes   Sig: Take 1 capsule (50,000 Units) by mouth every 7 days      Facility-Administered Medications: None     Allergies   Allergies   Allergen Reactions    Cats        Social History   Social History     Tobacco Use    Smoking status: Never    Smokeless tobacco: Never   Substance Use Topics    Alcohol use: No     Social History     Social History Narrative    Not on file     Family History   Family history reviewed with  patient and is noncontributory.    Review of Systems   A Comprehensive greater than 10 system review of systems was carried out.  Pertinent positives and negatives are noted above.  Otherwise negative for contributory information.    Physical Exam   Temp: 98  F (36.7  C) Temp src: Oral BP: 133/79 Pulse: 80   Resp: 18 SpO2: 97 % O2 Device: None (Room air)    Vital Signs with Ranges  Temp:  [97.7  F (36.5  C)-98  F (36.7  C)] 98  F (36.7  C)  Pulse:  [] 80  Resp:  [16-18] 18  BP: (125-149)/(78-92) 133/79  SpO2:  [97 %-100 %] 97 %  307 lbs 15.72 oz    GEN:  Alert, oriented x 3, appears comfortable sitting up on gurney, no overt distress  HEENT:  Normocephalic/atraumatic, no scleral icterus, no nasal discharge, mouth moist.  CV:  Regular rate and rhythm, no murmur or JVD.  S1 + S2 noted, no S3 or S4.  LUNGS:  Clear to auscultation bilaterally without rales/rhonchi/wheezing/retractions.  Symmetric chest rise on inhalation noted.  ABD:  Active bowel sounds, soft, non-tender/non-distended.  No rebound/guarding/rigidity.  EXT:  No LE edema.  No cyanosis.  No acute joint synovitis noted.  SKIN:  Dry to touch, no exanthems noted in the visualized areas.  NEURO:  Symmetric muscle strength, sensation to touch grossly intact.  Coordination symmetric on general exam. CN II-XII grossly intact. No significant eye nystagmus appreciated. No new focal deficits appreciated.    Data   Data reviewed today:  I personally reviewed the EKG tracing showing rate of 92 bpm in SR .    Results for orders placed or performed during the hospital encounter of 01/29/24   CT Head w/o Contrast     Status: None    Narrative    CT HEAD WITHOUT CONTRAST January 29, 2024 10:44 AM     HISTORY: Dizziness       COMPARISON: No prior similar studies.    TECHNIQUE: Using multidetector thin collimation helical acquisition  technique, axial, coronal and sagittal CT images from the skull base  to the vertex were obtained without intravenous contrast.    (topogram) image(s) also obtained and reviewed.    FINDINGS: No intracranial hemorrhage, mass effect, or midline shift.  No acute loss of gray-white matter differentiation in the cerebral  hemispheres. Ventricles are proportionate to the cerebral sulci. Clear  basal cisterns. 4.6 x 2.6 cm right anterior temporal arachnoid cyst  with associated mass effect.    The bony calvaria and the bones of the skull base are normal. Mucous  retention cysts within bilateral maxillary sinuses and mucosal  thickening in the left ethmoidal air cells. Mastoid air cells are  clear. Grossly normal orbits.       Impression    IMPRESSION:   1. No acute intracranial pathology.   2. Right anterior temporal arachnoid cyst with associated mass effect.    FARRAH FELICIANO MD         SYSTEM ID:  [id number]   MR Brain w/o & w Contrast     Status: None    Narrative    EXAM: MR BRAIN W/O and W CONTRAST  LOCATION: [facility]  DATE: 1/29/2024    INDICATION: persistent dizziness, anterior temporal arachoid cyst  COMPARISON: None.  CONTRAST: 14mL Gadavist  TECHNIQUE: Routine multiplanar multisequence head MRI without and with intravenous contrast.    FINDINGS:  INTRACRANIAL CONTENTS: No acute or subacute infarct. Mild arachnoid cyst anterior aspect right middle cranial fossa. Normal brain parenchymal signal. Normal ventricles and sulci. Normal position of the cerebellar tonsils. No pathologic contrast   enhancement.    SELLA: No abnormality accounting for technique.    OSSEOUS STRUCTURES/SOFT TISSUES: Normal marrow signal. The major intracranial vascular flow voids are maintained.     ORBITS: No abnormality accounting for technique.     SINUSES/MASTOIDS: No paranasal sinus mucosal disease. No middle ear or mastoid effusion.       Impression    IMPRESSION:  1.  No acute infarct, mass, mass effect, or hemorrhage.  2.  Mild arachnoid cyst anterior aspect right middle cranial fossa.     Basic metabolic panel     Status:  Abnormal   Result Value Ref Range    Sodium 133 (L) 135 - 145 mmol/L    Potassium 4.0 3.4 - 5.3 mmol/L    Chloride 98 98 - 107 mmol/L    Carbon Dioxide (CO2) 26 22 - 29 mmol/L    Anion Gap 9 7 - 15 mmol/L    Urea Nitrogen 9.6 6.0 - 20.0 mg/dL    Creatinine 0.70 0.67 - 1.17 mg/dL    GFR Estimate >90 >60 mL/min/1.73m2    Calcium 9.2 8.6 - 10.0 mg/dL    Glucose 101 (H) 70 - 99 mg/dL   CBC with platelets and differential     Status: None   Result Value Ref Range    WBC Count 5.9 4.0 - 11.0 10e3/uL    RBC Count 5.06 4.40 - 5.90 10e6/uL    Hemoglobin 14.9 13.3 - 17.7 g/dL    Hematocrit 44.1 40.0 - 53.0 %    MCV 87 78 - 100 fL    MCH 29.4 26.5 - 33.0 pg    MCHC 33.8 31.5 - 36.5 g/dL    RDW 12.0 10.0 - 15.0 %    Platelet Count 229 150 - 450 10e3/uL    % Neutrophils 60 %    % Lymphocytes 27 %    % Monocytes 7 %    % Eosinophils 4 %    % Basophils 1 %    % Immature Granulocytes 1 %    NRBCs per 100 WBC 0 <1 /100    Absolute Neutrophils 3.6 1.6 - 8.3 10e3/uL    Absolute Lymphocytes 1.6 0.8 - 5.3 10e3/uL    Absolute Monocytes 0.4 0.0 - 1.3 10e3/uL    Absolute Eosinophils 0.2 0.0 - 0.7 10e3/uL    Absolute Basophils 0.1 0.0 - 0.2 10e3/uL    Absolute Immature Granulocytes 0.0 <=0.4 10e3/uL    Absolute NRBCs 0.0 10e3/uL   EKG 12-lead, tracing only     Status: None   Result Value Ref Range    Systolic Blood Pressure  mmHg    Diastolic Blood Pressure  mmHg    Ventricular Rate 92 BPM    Atrial Rate 92 BPM    SC Interval 154 ms    QRS Duration 96 ms     ms    QTc 440 ms    P Axis 56 degrees    R AXIS 22 degrees    T Axis 28 degrees    Interpretation ECG       Sinus rhythm  Normal ECG  When compared with ECG of 16-DEC-2023 19:30,  No significant change was found     CBC with platelets differential     Status: None    Narrative    The following orders were created for panel order CBC with platelets differential.  Procedure                               Abnormality         Status                     ---------                                -----------         ------                     CBC with platelets and d...[[id number]]                      Final result                 Please view results for these tests on the individual orders.     ANDRADE Williamson [facility]  Securely message with the Vocera Web Console (learn more here)  Text page via [employer] Paging/Directory

## 2024-01-30 NOTE — PROGRESS NOTES
[facility]  Hospitalist Progress Note  Ten Martino MD 01/30/2024    Reason for Stay (Diagnosis): Dizziness         Assessment and Plan:      Summary of Stay: David Orta is a 23 year old male without significant PMH who presents to the ED on 1/29/2024 for evaluation of recurrent episodes of dizziness.      Recurrent dizziness:  Recent head trauma with possible concussion:   -Had head trauma hitting his head on a metal shelf while working as an  in 11/2023.  -He had intermittent episodes of dizziness, headache since then,  -He had worsening symptom on 1/28, then again on the day of admission.  -CT head showed arachnoid cyst.  -MRI did not show any acute abnormality.  -PT vestibular therapy.  -Patient also issues with fixating on his fusion and worse when he is thriving sitting up quickly.  -Noted slight nystagmus with left gaze.  -Neurosurgery consulted.  -Neurology consult pending.  -Ambulate the patient as tolerated.  -Continue meclizine, as needed Valium.  -Stopped IV fluid.  -Tolerating oral intake.  -If he continues to improve and after recommendation from neurologist, will consider discharging him tomorrow.      incidental arachnoid cyst:   -Initial CT of head in ED on 1/29 showed right anterior temporal arachnoid cyst with associated mass effect.   -This was discussed with neurosurgery also evaluated the patient.  -Patient seen and was had associated with subarachnoid cyst and they recommended neurology consult.  -MRI of the brain also done as reported without any acute abnormality.      Recent right eye infection: Reportedly diagnosed by infection of the right eye, started on oral Keflex, not clear at this time what he is being treated, but he will complete his antibiotic.     Clinically Significant Risk Factors Present on Admission                       # Obesity: Estimated body mass index is 37.62 kg/m  as calculated from the following:    Height as of this encounter:  "1.88 m (6' 2\").    Weight as of this encounter: 132.9 kg (292 lb 15.9 oz).              DVT Prophylaxis: Ambulate every shift  Code Status: Full Code  Discharge Dispo: Home  Estimated Disch Date / # of Days until Disch: 1 day pending neurology consult and improvement.  I discussed with patient, with his mother.  Also briefly discussed with neurosurgery team.        Interval History (Subjective):      Patient seen and examined, assumed care today, feels weak and dizzy with slight movement. No nausea or vomiting, tolerating.                  Physical Exam:      Last Vital Signs:  /74   Pulse 98   Temp 98.3  F (36.8  C) (Oral)   Resp 18   Ht 1.88 m (6' 2\")   Wt 132.9 kg (292 lb 15.9 oz)   SpO2 95%   BMI 37.62 kg/m      I/O last 3 completed shifts:  In: 1850 [P.O.:480; I.V.:1370]  Out: -   Vitals:    01/29/24 0946 01/29/24 2000   Weight: 139.7 kg (307 lb 15.7 oz) 132.9 kg (292 lb 15.9 oz)     Current Facility-Administered Medications   Medication    acetaminophen (TYLENOL) tablet 650 mg    Or    acetaminophen (TYLENOL) Suppository 650 mg    cephALEXin (KEFLEX) capsule 500 mg    diazepam (VALIUM) tablet 2 mg    ketorolac (TORADOL) injection 30 mg    meclizine (ANTIVERT) tablet 25 mg    ondansetron (ZOFRAN ODT) ODT tab 4 mg    Or    ondansetron (ZOFRAN) injection 4 mg    prochlorperazine (COMPAZINE) injection 10 mg    Or    prochlorperazine (COMPAZINE) tablet 10 mg    Or    prochlorperazine (COMPAZINE) suppository 25 mg    senna-docusate (SENOKOT-S/PERICOLACE) 8.6-50 MG per tablet 1 tablet    Or    senna-docusate (SENOKOT-S/PERICOLACE) 8.6-50 MG per tablet 2 tablet       Constitutional: Awake, alert, cooperative, no apparent distress     Respiratory: Clear to auscultation bilaterally, no crackles or wheezing   Cardiovascular: Regular rate and rhythm, normal S1 and S2, and no murmur noted   Abdomen: Normal bowel sounds, soft, non-distended, non-tender   Skin: No rashes, no cyanosis, dry to touch   Neuro: Alert " and oriented x3, no weakness, numbness, memory loss positive for left gaze nystagmus.   Extremities: No edema, normal range of motion.   Other(s):HEENT Pink, un icteric, moist mucosa.       All other systems: Negative          Medications:      All current medications were reviewed with changes reflected in problem list.         Data:      All new lab and imaging data was reviewed.   Labs:  Recent Labs   Lab 01/30/24  0642 01/29/24  1115    133*   POTASSIUM 4.2 4.0   CHLORIDE 104 98   CO2 28 26   ANIONGAP 7 9   * 101*   BUN 12.3 9.6   CR 0.78 0.70   GFRESTIMATED >90 >90   EB 9.2 9.2     Recent Labs   Lab 01/29/24  1115   WBC 5.9   HGB 14.9   HCT 44.1   MCV 87         Imaging:   Results for orders placed or performed during the hospital encounter of 01/29/24   CT Head w/o Contrast    Narrative    CT HEAD WITHOUT CONTRAST January 29, 2024 10:44 AM     HISTORY: Dizziness       COMPARISON: No prior similar studies.    TECHNIQUE: Using multidetector thin collimation helical acquisition  technique, axial, coronal and sagittal CT images from the skull base  to the vertex were obtained without intravenous contrast.   (topogram) image(s) also obtained and reviewed.    FINDINGS: No intracranial hemorrhage, mass effect, or midline shift.  No acute loss of gray-white matter differentiation in the cerebral  hemispheres. Ventricles are proportionate to the cerebral sulci. Clear  basal cisterns. 4.6 x 2.6 cm right anterior temporal arachnoid cyst  with associated mass effect.    The bony calvaria and the bones of the skull base are normal. Mucous  retention cysts within bilateral maxillary sinuses and mucosal  thickening in the left ethmoidal air cells. Mastoid air cells are  clear. Grossly normal orbits.       Impression    IMPRESSION:   1. No acute intracranial pathology.   2. Right anterior temporal arachnoid cyst with associated mass effect.    FARRAH FELICIANO MD         SYSTEM ID:  [id number]   MR  Brain w/o & w Contrast    Narrative    EXAM: MR BRAIN W/O and W CONTRAST  LOCATION: [facility]  DATE: 1/29/2024    INDICATION: persistent dizziness, anterior temporal arachoid cyst  COMPARISON: None.  CONTRAST: 14mL Gadavist  TECHNIQUE: Routine multiplanar multisequence head MRI without and with intravenous contrast.    FINDINGS:  INTRACRANIAL CONTENTS: No acute or subacute infarct. Mild arachnoid cyst anterior aspect right middle cranial fossa. Normal brain parenchymal signal. Normal ventricles and sulci. Normal position of the cerebellar tonsils. No pathologic contrast   enhancement.    SELLA: No abnormality accounting for technique.    OSSEOUS STRUCTURES/SOFT TISSUES: Normal marrow signal. The major intracranial vascular flow voids are maintained.     ORBITS: No abnormality accounting for technique.     SINUSES/MASTOIDS: No paranasal sinus mucosal disease. No middle ear or mastoid effusion.       Impression    IMPRESSION:  1.  No acute infarct, mass, mass effect, or hemorrhage.  2.  Mild arachnoid cyst anterior aspect right middle cranial fossa.

## 2024-01-30 NOTE — DISCHARGE SUMMARY
[facility]  Discharge Summary  Name: David Orta    MRN: [id number]  YOB: 2000    Age: 23 year old  Date of Discharge:  1/30/2024  Date of Admission: 1/29/2024  Primary Care Provider: Venu Guzman  Discharge Physician:  Ten Martino M.D  Discharging Service:  Hospitalist      Discharge Diagnosis:  David Orta is a 23 year old male without significant PMH who presents to the ED on 1/29/2024 for evaluation of recurrent episodes of dizziness.      Recurrent dizziness:  Recent head trauma with possible concussion:   -Had head trauma hitting his head on a metal shelf while working as an  in 11/2023.  -He had intermittent episodes of dizziness, headache since then. He had worsening symptom on 1/28, then again on the day of admission. CT head showed arachnoid cyst. MRI did not show any acute abnormality. PT vestibular therapy done. Patient also issues with fixating on his vision and worse when he is moving his head quickly. Noted slight nystagmus with left gaze.Neurosurgery consulted.  Neurology also consulted, case discussed.  Neurology thinks this is likely related to stress and neurologic origin.  Patient will have follow-up with primary care provider and therapist as an outpatient.  Discussed with patient and with his mother at bedside.  He was given work release for few days.      incidental arachnoid cyst:   -Initial CT of head in ED on 1/29 showed right anterior temporal arachnoid cyst with associated mass effect.   -This was discussed with neurosurgery also evaluated the patient.  -Patient seen and was had associated with subarachnoid cyst and they recommended neurology consult.  -MRI of the brain also done as reported without any acute abnormality.   This finding is incidental, and has nothing to do with with his symptoms     Recent right eye infection: Reportedly diagnosed by infection of the right eye, started on oral Keflex, not clear at this time what  "he is being treated, but he will complete his antibiotic.         Clinically Significant Risk Factors Present on Admission                        # Obesity: Estimated body mass index is 37.62 kg/m  as calculated from the following:    Height as of this encounter: 1.88 m (6' 2\").    Weight as of this encounter: 132.9 kg (292 lb 15.9 oz).      Other Diagnosis:  [place]     Discharge Disposition:  Discharged to home     Allergies:  Allergies   Allergen Reactions    Cats         Discharge Medications:   Current Discharge Medication List        START taking these medications    Details   hydrOXYzine HCl (ATARAX) 25 MG tablet Take 1 tablet (25 mg) by mouth 3 times daily as needed for anxiety  Qty: 30 tablet, Refills: 0    Associated Diagnoses: Dizziness; Anxiety      meclizine (ANTIVERT) 25 MG tablet Take 1 tablet (25 mg) by mouth 3 times daily as needed for dizziness  Qty: 30 tablet, Refills: 0    Associated Diagnoses: Dizziness           CONTINUE these medications which have NOT CHANGED    Details   cephALEXin (KEFLEX) 500 MG capsule Take 500 mg by mouth 2 times daily      vitamin D3 (CHOLECALCIFEROL) 1.25 MG (67584 UT) capsule Take 1 capsule (50,000 Units) by mouth every 7 days  Qty: 12 capsule, Refills: 0    Associated Diagnoses: Vitamin D deficiency              Condition on Discharge:  Discharge condition: Stable   Discharge vitals: Blood pressure 132/81, pulse 62, temperature 98.8  F (37.1  C), temperature source Oral, resp. rate 18, height 1.88 m (6' 2\"), weight 132.9 kg (292 lb 15.9 oz), SpO2 96%.   Code status on discharge: Full Code     History of Illness:  See detailed admission note for full details.    Significant Physical Exam Findings:    Constitutional: Awake, alert, cooperative, no apparent distress      Respiratory: Clear to auscultation bilaterally, no crackles or wheezing   Cardiovascular: Regular rate and rhythm, normal S1 and S2, and no murmur noted   Abdomen: Normal bowel sounds, soft, non-distended, " non-tender   Skin: No rashes, no cyanosis, dry to touch   Neuro: Alert and oriented x3, no weakness, numbness, memory loss positive for left gaze nystagmus.   Extremities: No edema, normal range of motion.   Other(s):HEENT Pink, un icteric, moist mucosa.         All other systems: Negative       Procedures other than Imaging:  none     Imaging:  Recent Results (from the past 24 hour(s))   MR Brain w/o & w Contrast    Narrative    EXAM: MR BRAIN W/O and W CONTRAST  LOCATION: [facility]  DATE: 1/29/2024    INDICATION: persistent dizziness, anterior temporal arachoid cyst  COMPARISON: None.  CONTRAST: 14mL Gadavist  TECHNIQUE: Routine multiplanar multisequence head MRI without and with intravenous contrast.    FINDINGS:  INTRACRANIAL CONTENTS: No acute or subacute infarct. Mild arachnoid cyst anterior aspect right middle cranial fossa. Normal brain parenchymal signal. Normal ventricles and sulci. Normal position of the cerebellar tonsils. No pathologic contrast   enhancement.    SELLA: No abnormality accounting for technique.    OSSEOUS STRUCTURES/SOFT TISSUES: Normal marrow signal. The major intracranial vascular flow voids are maintained.     ORBITS: No abnormality accounting for technique.     SINUSES/MASTOIDS: No paranasal sinus mucosal disease. No middle ear or mastoid effusion.       Impression    IMPRESSION:  1.  No acute infarct, mass, mass effect, or hemorrhage.  2.  Mild arachnoid cyst anterior aspect right middle cranial fossa.          Consultations:  Consultation during this admission received from Neurosurgery and neurology.     Recent Lab Results:  Recent Labs   Lab 01/29/24  1115   WBC 5.9   HGB 14.9   HCT 44.1   MCV 87        Recent Labs   Lab 01/30/24  0642 01/29/24  1115    133*   POTASSIUM 4.2 4.0   CHLORIDE 104 98   CO2 28 26   ANIONGAP 7 9   * 101*   BUN 12.3 9.6   CR 0.78 0.70   GFRESTIMATED >90 >90   EB 9.2 9.2          Pending Results:    Unresulted  Labs Ordered in the Past 30 Days of this Admission       No orders found for last 31 day(s).                Reason for your hospital stay    Dizziness     Follow-up and recommended labs and tests     Follow up with primary care provider, LUZ JONES, within 7 days for hospital follow- up.   Follow up with Ophthalmology as instructed.     Activity    Your activity upon discharge: activity as tolerated     Diet    Follow this diet upon discharge: Orders Placed This Encounter      Regular Diet Adult           Total time spent in face to face contact with the patient and coordinating discharge was:>30 Minutes.

## 2024-01-31 ENCOUNTER — CARE COORDINATION (OUTPATIENT)
Dept: FAMILY MEDICINE | Facility: CLINIC | Age: 24
End: 2024-01-31

## 2024-01-31 NOTE — PLAN OF CARE
Physical Therapy Discharge Summary    Reason for therapy discharge:    Discharged to home with outpatient therapy.    Progress towards therapy goal(s). [facility] for goal details.  Goals partially met.  Barriers to achieving goals:   discharge from facility.    Therapy recommendation(s):    Continued therapy is recommended.  Rationale/Recommendations:  Rec follow up with OP vestibular therapy for progression of mobility and symptom management.

## 2024-01-31 NOTE — PROGRESS NOTES
Care Coordination Initial Assessment    The patient was admitted into [facility] on 1/29/24 for dizziness. He was diagnosed with an arachnoid cyst. He was discharged on 1/30/24 with instructions to follow up with PCP and with neurosurgery.     PCP: Venu Guzman    Referral Source:  ED/IP List    Utilization:   Last PCP Appt.: 12/26/23    Health Maintenance Reviewed: Yes    Current Medical Health Concerns:   Please review patients current medical problem list.    Patient/Caregiver Understanding: Yes    Medication Management:   NA    Functional Status:   Independent with all ADL/IADL's    Current Behavioral Health Concerns:   Anxiety: Has history of anxiety concerns    Patient/Caregiver Understanding:  Yes    Psychosocial:  Living Situation:  Lives at home with mom and dad     Gaps:    Health Maintenance: Can review more in clinic     Resources Given:    NA    Plan:   I spoke to patient over the phone who requested me to call his mother to get this set up. I did call Vesta, his mother and she did not have a lot of time to talk but was able to get the patient scheduled his hospital follow up with Dr. Guzman on Friday 2/2/24 at 1:00 pm.

## 2024-03-18 ENCOUNTER — OFFICE VISIT (OUTPATIENT)
Dept: FAMILY MEDICINE | Facility: CLINIC | Age: 24
End: 2024-03-18

## 2024-03-18 VITALS
RESPIRATION RATE: 22 BRPM | DIASTOLIC BLOOD PRESSURE: 82 MMHG | BODY MASS INDEX: 38.9 KG/M2 | WEIGHT: 303 LBS | SYSTOLIC BLOOD PRESSURE: 122 MMHG | OXYGEN SATURATION: 97 % | HEART RATE: 93 BPM

## 2024-03-18 DIAGNOSIS — E66.812 CLASS 2 SEVERE OBESITY DUE TO EXCESS CALORIES WITH SERIOUS COMORBIDITY IN ADULT, UNSPECIFIED BMI (H): ICD-10-CM

## 2024-03-18 DIAGNOSIS — R51.9 INTERMITTENT HEADACHE: ICD-10-CM

## 2024-03-18 DIAGNOSIS — Z09 HOSPITAL DISCHARGE FOLLOW-UP: Primary | ICD-10-CM

## 2024-03-18 DIAGNOSIS — R29.818 SUSPECTED SLEEP APNEA: ICD-10-CM

## 2024-03-18 DIAGNOSIS — R07.9 INTERMITTENT CHEST PAIN: ICD-10-CM

## 2024-03-18 DIAGNOSIS — E66.01 CLASS 2 SEVERE OBESITY DUE TO EXCESS CALORIES WITH SERIOUS COMORBIDITY IN ADULT, UNSPECIFIED BMI (H): ICD-10-CM

## 2024-03-18 DIAGNOSIS — E55.9 VITAMIN D DEFICIENCY: ICD-10-CM

## 2024-03-18 PROCEDURE — 36415 COLL VENOUS BLD VENIPUNCTURE: CPT | Performed by: STUDENT IN AN ORGANIZED HEALTH CARE EDUCATION/TRAINING PROGRAM

## 2024-03-18 PROCEDURE — 99495 TRANSJ CARE MGMT MOD F2F 14D: CPT | Performed by: STUDENT IN AN ORGANIZED HEALTH CARE EDUCATION/TRAINING PROGRAM

## 2024-03-18 NOTE — PROGRESS NOTES
Assessment & Plan       ICD-10-CM    1. Hospital discharge follow-up  Z09       2. Vitamin D deficiency  E55.9 VITAMIN D DEFICIENCY SCREENING ([facility])     VENOUS COLLECTION      3. Suspected sleep apnea  R29.818       4. Intermittent headache  R51.9       5. Intermittent chest pain  R07.9 Adult Cardiology Allisonal Chance Referral - To a [facility] Location      6. Class 2 severe obesity due to excess calories with serious comorbidity in adult, unspecified BMI (H)  E66.01          Delayed hospital follow-up. Feeling better overall.     Prior EKG and labs reviewed, pt and mother requesting Cardiology consult. I did advise I think most likely that these and other sx are related to untreated anxiety/underlying mood disorder and physical inactivity.     At end of visit had derm concern, advised separate visit at his soonest convenience and NICK for prior Derm consult completed.    MED REC REQUIRED  Post Medication Reconciliation Status: discharge medications reconciled and changed, per note/orders    Patient Instructions   Vitamin D recheck today, plan to start 5000 units daily    Cardiology will call you     Keep getting used to glasses    Follow-up with ENT clinic    Follow-up here or Dermatology for mole     >30 minutes spent on the date of the encounter doing chart review, history and exam, documentation and further activities per the note.    Venu Guzman MD, [facility]      Subjective    is a 23 year old, presenting for the following health issues:  Hospital F/U (Was admitted into [facility] from 1/29/24-1/30/24 for headaches. Incidental arachnoid cyst seen on CT, MRI was normal. Neurosurgery consult and advised unrelated to any sx. /Recent completed 11080 units of Vitamin D course and needs this rechecked today )    HPI         Hospital Follow-up Visit:    Hospital/Nursing Home/IP Rehab Facility: [facility]  Date of Admission:  1/29/24  Date of Discharge: 1/30/24  Reason(s) for Admission: headaches, incidental finding of arachnoid cyst    Was your hospitalization related to COVID-19? No   Problems taking medications regularly:  None  Medication changes since discharge: was given meclizine, hydroxyzine for anxiety and vitamin D due to insufficiency with this   Problems adhering to non-medication therapy:  None-never took the hydroxyzine or meclizine but did recently finish vitamin d     Summary of hospitalization:  [facility] discharge summary reviewed  Diagnostic Tests/Treatments reviewed.  Follow up needed: ongoing ENT management of sinus issues and suspected sleep apnea. HAd eye exam and has new glasses.   Other Healthcare Providers Involved in Patient s Care: Neurology, eye clinic, ENT      Update since discharge: improved. Getting used to glasses. ENT reportedly recommended sinus surgery, unclear if sleep study was done.   Patient reports occasionally feels like his heart is beating hard, often at rest. Sometimes feels his chest tight. No exertional sx, sedentary lifestyle.    Plan of care communicated with patient           Objective    /82 (BP Location: Left arm, Patient Position: Sitting, Cuff Size: Adult Large)   Pulse 93   Resp 22   Wt 137.4 kg (303 lb)   SpO2 97%   BMI 38.90 kg/m    Body mass index is 38.9 kg/m .  Physical Exam   Alert, NAD  NC/AT  Sclerae anicteric  RRR  Resp nonlabored  Skin warm and dry  No focal neuro deficits. Speech intact. Normal gait.  Appropriate affect    Inpatient imaging and labs reviewed         Signed Electronically by: LUZ JONES MD

## 2024-03-18 NOTE — PATIENT INSTRUCTIONS
Vitamin D recheck today, plan to start 5000 units daily    Cardiology will call you     Keep getting used to glasses    Follow-up with ENT clinic    Follow-up here or Dermatology for mole

## 2024-03-18 NOTE — LETTER
March 25, 2024      David Orta  [address] [place] [address]        Dear ,    Vitamin D is back in normal range.     Resulted Orders   VITAMIN D DEFICIENCY SCREENING ([facility])   Result Value Ref Range    Vitamin D 25-OH Total 50 30 - 100 ng/mL      Comment:      Vitamin D Status         25-OH Vitamin D:     Deficiency:                    <20 ng/mL  Insufficiency:             20 - 29 ng/mL  Optimal:                 > or = 30 ng/mL     For 25-OH Vitamin D testing on patients on   D2-supplementation and patients for whom quantitation   of D2 and D3 fractions is required, the [facility]AssureD(TM)  25-OH VIT D, (D2,D3), LC/MS/MS is recommended: order   code 05167 (patients >2yrs).     See Note 1     Note 1     For additional information, please refer to   http://education.[place].com/faq/[email or web address]   (This link is being provided for informational/  educational purposes only.)         If you have any questions or concerns, please call the clinic at the number listed above.     Sincerely.      Venu Guzman MD

## 2024-03-18 NOTE — NURSING NOTE
Chief Complaint   Patient presents with    Hospital F/U     Was admitted into [facility] from 1/29/24-1/30/24 for an arachnoid cyst, did not get this removed and was told that this has been there since birth, patient declines any pain but sometimes does feel a pressure in the forehead area, overall doing alright, just completed 77292 units of Vitamin D and needs this rechecked today      Pre-visit Screening:  Immunizations:  up to date  Colonoscopy:  na  Mammogram: na  Asthma Action Test/Plan:  na  PHQ9:  PHQ2 done today   GAD7: given today-pt has some concerns at hospital-was given hydroxyzine but did not fill this   Questioned patient about current smoking habits Pt. has never smoked.  Ok to leave detailed message on voice mail for today's visit only yes, phone # [phone] (home)

## 2024-03-20 LAB — VITAMIN D, 25-OH, TOTAL - QUEST: 50 NG/ML (ref 30–100)

## 2024-05-01 ENCOUNTER — TELEPHONE (OUTPATIENT)
Dept: FAMILY MEDICINE | Facility: CLINIC | Age: 24
End: 2024-05-01

## 2024-05-01 NOTE — TELEPHONE ENCOUNTER
Patients mom called 4.30.2024 asking about the Cardiology Referral that Dr. Guzman issued patient. She stated that [facility] has not called patient to schedule.  Mom was given info to call and schedule patient with     [facility]   [facility]  [id number] [place] [address] [address]  [place] [address]  [phone] -- appt line  [phone] -- fax    [facility] called patients cell not moms. They also sent a letter to patient to call and schedule his appointment.

## 2024-06-17 PROBLEM — Z76.89 HEALTH CARE HOME: Status: RESOLVED | Noted: 2017-10-18 | Resolved: 2024-06-17

## 2024-08-23 ENCOUNTER — OFFICE VISIT (OUTPATIENT)
Dept: CARDIOLOGY | Facility: CLINIC | Age: 24
End: 2024-08-23
Attending: STUDENT IN AN ORGANIZED HEALTH CARE EDUCATION/TRAINING PROGRAM
Payer: COMMERCIAL

## 2024-08-23 VITALS
BODY MASS INDEX: 38.43 KG/M2 | DIASTOLIC BLOOD PRESSURE: 86 MMHG | SYSTOLIC BLOOD PRESSURE: 152 MMHG | HEIGHT: 74 IN | HEART RATE: 92 BPM | WEIGHT: 299.4 LBS

## 2024-08-23 DIAGNOSIS — R00.2 PALPITATIONS: Primary | ICD-10-CM

## 2024-08-23 DIAGNOSIS — R07.9 INTERMITTENT CHEST PAIN: ICD-10-CM

## 2024-08-23 PROCEDURE — 99204 OFFICE O/P NEW MOD 45 MIN: CPT | Performed by: INTERNAL MEDICINE

## 2024-08-23 PROCEDURE — 93000 ELECTROCARDIOGRAM COMPLETE: CPT | Performed by: INTERNAL MEDICINE

## 2024-08-23 RX ORDER — FLUTICASONE PROPIONATE 50 MCG
1 SPRAY, SUSPENSION (ML) NASAL DAILY
COMMUNITY

## 2024-08-23 NOTE — PROGRESS NOTES
CARDIOLOGY CONSULT    REASON FOR CONSULT: chest pain    PRIMARY CARE PHYSICIAN:  LUZ JONES    HISTORY OF PRESENT ILLNESS:  24-year-old male seen for chest pain.    He reports some intermittent palpitations and chest discomfort over the past 8 months.  This occurs about once per week on average.  He describes a discomfort in his mid chest often associated with the palpitations and lightheadedness.  A few times he is checked his heart rate and it tends to be 90.  These episodes seem to come on randomly, not with exertion.  He denies overt syncope.  He used to drink some energy drinks, but is now given this up.    PAST MEDICAL HISTORY:  1.  Abnormal fasting glucose    MEDICATIONS:  No current outpatient medications on file.     No current facility-administered medications for this visit.       ALLERGIES:  Allergies   Allergen Reactions    Cats        SOCIAL HISTORY:  I have reviewed this patient's social history and updated it with pertinent information if needed.  YULISSA Orta  reports that he has never smoked. He has never used smokeless tobacco. He reports that he does not drink alcohol and does not use drugs.    FAMILY HISTORY:  I have reviewed this patient's family history and updated it with pertinent information if needed.   Family History   Problem Relation Age of Onset    Diabetes Mother     Diabetes Father     Cerebrovascular Disease Maternal Aunt     Pacemaker Maternal Aunt        REVIEW OF SYSTEMS:  Constitutional:  No weight loss, fever, chills  HEENT:  Eyes:  No visual loss, blurred vision, double vision or yellow sclerae. No hearing loss, sneezing, congestion, runny nose or sore throat.  Skin:  No rash or itching.  Cardiovascular: per HPI  Respiratory: per HPI  GI:  No anorexia, nausea, vomiting or diarrhea. No abdominal pain or blood.  :  No dysurea, hematuria  Neurologic:  No headache, paralysis, ataxia, numbness or tingling in the extremities. No change in bowel or bladder  "control.  Musculoskeletal:  No muscle pain  Hematologic:  No bleeding or bruising.  Lymphatics:  No enlarged nodes. No history of splenectomy.  Endocrine:  No reports of sweating, cold or heat intolerance. No polyuria or polydipsia.  Allergies:  No history of asthma, hives, eczema or rhinitis.    PHYSICAL EXAM:  BP (!) 152/86 (BP Location: Right arm, Patient Position: Sitting, Cuff Size: Adult Large)   Pulse 92   Ht 1.88 m (6' 2\")   Wt 135.8 kg (299 lb 6.4 oz)   BMI 38.44 kg/m    Constitutional: awake, alert, no distress  Eyes: PERRL, sclera nonicteric  ENT: trachea midline  Respiratory: Lungs clear  Cardiovascular: Regular rate and rhythm, no murmur, no ectopy  GI: nondistended, nontender, bowel sounds present  Lymph/Hematologic: no lymphadenopathy  Skin: dry, no rash  Musculoskeletal: good muscle tone, strength 5/5 in upper and lower extremities  Neurologic: no focal deficits  Neuropsychiatric: appropriate affact    DATA:  Labs:     Recent Labs   Lab Test 12/26/23  0000 11/24/21  1509   CHOL 145 150   HDL 51 51   LDL 84 89   TRIG 66 48   CHOLHDLRATIO 3 3     EKG: August 23: Sinus rhythm, normal EKG    ASSESSMENT:  24-year-old male seen for chest discomfort and palpitations.  His symptoms seem to come on somewhat randomly, not predictably with exertion.  There is a fairly wide differential at this point.  Zio monitor will be done to look for any SVT, ectopy, or other arrhythmia that may be causing the symptoms.  Stress test will be done to rule out any underlying ischemia or structural heart disease or somewhat unusual findings such as anomalous coronary artery.  Lab work including thyroid has been normal in the past.  Troponins have been normal and they were checked previously.    RECOMMENDATIONS:  1.  Chest discomfort with palpitations  -Treadmill stress echo  -14-day ZIO monitor    Follow-up as needed based on test results.    Mikey Henderson MD  Cardiology - [facility]  Pager:  [phone]  Text " Page  August 23, 2024

## 2024-08-23 NOTE — LETTER
8/23/2024    LUZ JONES MD  [address]  [facility] [address]    RE: David DUENAS You       Dear Colleague,     I had the pleasure of seeing David Orta in the [facility].  CARDIOLOGY CONSULT    REASON FOR CONSULT: chest pain    PRIMARY CARE PHYSICIAN:  LUZ JONES    HISTORY OF PRESENT ILLNESS:  24-year-old male seen for chest pain.    He reports some intermittent palpitations and chest discomfort over the past 8 months.  This occurs about once per week on average.  He describes a discomfort in his mid chest often associated with the palpitations and lightheadedness.  A few times he is checked his heart rate and it tends to be 90.  These episodes seem to come on randomly, not with exertion.  He denies overt syncope.  He used to drink some energy drinks, but is now given this up.    PAST MEDICAL HISTORY:  1.  Abnormal fasting glucose    MEDICATIONS:  No current outpatient medications on file.     No current facility-administered medications for this visit.       ALLERGIES:  Allergies   Allergen Reactions     Cats        SOCIAL HISTORY:  I have reviewed this patient's social history and updated it with pertinent information if needed. David DUENAS You  reports that he has never smoked. He has never used smokeless tobacco. He reports that he does not drink alcohol and does not use drugs.    FAMILY HISTORY:  I have reviewed this patient's family history and updated it with pertinent information if needed.   Family History   Problem Relation Age of Onset     Diabetes Mother      Diabetes Father      Cerebrovascular Disease Maternal Aunt      Pacemaker Maternal Aunt        REVIEW OF SYSTEMS:  Constitutional:  No weight loss, fever, chills  HEENT:  Eyes:  No visual loss, blurred vision, double vision or yellow sclerae. No hearing loss, sneezing, congestion, runny nose or sore throat.  Skin:  No rash or itching.  Cardiovascular: per HPI  Respiratory: per HPI  GI:  No  "anorexia, nausea, vomiting or diarrhea. No abdominal pain or blood.  :  No dysurea, hematuria  Neurologic:  No headache, paralysis, ataxia, numbness or tingling in the extremities. No change in bowel or bladder control.  Musculoskeletal:  No muscle pain  Hematologic:  No bleeding or bruising.  Lymphatics:  No enlarged nodes. No history of splenectomy.  Endocrine:  No reports of sweating, cold or heat intolerance. No polyuria or polydipsia.  Allergies:  No history of asthma, hives, eczema or rhinitis.    PHYSICAL EXAM:  BP (!) 152/86 (BP Location: Right arm, Patient Position: Sitting, Cuff Size: Adult Large)   Pulse 92   Ht 1.88 m (6' 2\")   Wt 135.8 kg (299 lb 6.4 oz)   BMI 38.44 kg/m    Constitutional: awake, alert, no distress  Eyes: PERRL, sclera nonicteric  ENT: trachea midline  Respiratory: Lungs clear  Cardiovascular: Regular rate and rhythm, no murmur, no ectopy  GI: nondistended, nontender, bowel sounds present  Lymph/Hematologic: no lymphadenopathy  Skin: dry, no rash  Musculoskeletal: good muscle tone, strength 5/5 in upper and lower extremities  Neurologic: no focal deficits  Neuropsychiatric: appropriate affact    DATA:  Labs:     Recent Labs   Lab Test 12/26/23  0000 11/24/21  1509   CHOL 145 150   HDL 51 51   LDL 84 89   TRIG 66 48   CHOLHDLRATIO 3 3     EKG: August 23: Sinus rhythm, normal EKG    ASSESSMENT:  24-year-old male seen for chest discomfort and palpitations.  His symptoms seem to come on somewhat randomly, not predictably with exertion.  There is a fairly wide differential at this point.  Zio monitor will be done to look for any SVT, ectopy, or other arrhythmia that may be causing the symptoms.  Stress test will be done to rule out any underlying ischemia or structural heart disease or somewhat unusual findings such as anomalous coronary artery.  Lab work including thyroid has been normal in the past.  Troponins have been normal and they were checked previously.    RECOMMENDATIONS:  1. "  Chest discomfort with palpitations  -Treadmill stress echo  -14-day ZIO monitor    Follow-up as needed based on test results.    Mikey Henderson MD  Cardiology - [facility]  Pager:  [phone]  Text Page  August 23, 2024        Thank you for allowing me to participate in the care of your patient.      Sincerely,     Mikey Henderson MD     [facility]  cc:   Venu Guzman MD  [address]  [place] [address]

## 2024-08-23 NOTE — PATIENT INSTRUCTIONS
Zio patch  Will order a Zio monitor.  This is a heart monitor that continuously records all your heartbeats.  If you have any symptoms, there is a button you can push that records when you had your symptoms.  The monitor is returned to the company in the mail and the report is sent to us.  This will show what your average heart rate is and if there are any rhythm issues with the heart including when you may have reported any symptoms.  You are encouraged to do all your regular activities while wearing this monitor.      Treadmill stress echocardiogram  Will schedule a treadmill echo stress test.  This is a test where we take some baseline pictures of your heart with an ultrasound (echocardiogram).  You will then exercise on the treadmill while hooked up to an EKG machine.  We monitored you heart rate and blood pressure.  We will have you exercise long enough to reach a certain target heart rate.  Immediately after exercise, additional pictures are taken of your heart with the ultrasound.    If there are any severe blockages in the heart, there may be changes on the EKG or the heart pictures may look abnormal.  If the test is abnormal, often a coronary angiogram is recommended at a later time.  This is the definitive test looking for blockages in the heart and potentially fixing them with stents.    Overall the test takes 30-45 minutes.  You will be on the treadmill for 5-12 minutes.  Wear some comfortable clothes and shoes for doing some light to moderate exercise.    Your test will be at [facility].

## 2024-08-24 ENCOUNTER — ORDERS ONLY (AUTO-RELEASED) (OUTPATIENT)
Dept: CARDIOLOGY | Facility: CLINIC | Age: 24
End: 2024-08-24
Payer: COMMERCIAL

## 2024-08-24 DIAGNOSIS — R00.2 PALPITATIONS: ICD-10-CM

## 2024-09-09 ENCOUNTER — HOSPITAL ENCOUNTER (OUTPATIENT)
Dept: CARDIOLOGY | Facility: CLINIC | Age: 24
Discharge: HOME | End: 2024-09-09
Attending: INTERNAL MEDICINE | Admitting: INTERNAL MEDICINE
Payer: COMMERCIAL

## 2024-09-09 DIAGNOSIS — R07.9 INTERMITTENT CHEST PAIN: ICD-10-CM

## 2024-09-09 PROCEDURE — 93018 CV STRESS TEST I&R ONLY: CPT | Performed by: INTERNAL MEDICINE

## 2024-09-09 PROCEDURE — 93016 CV STRESS TEST SUPVJ ONLY: CPT | Performed by: INTERNAL MEDICINE

## 2024-09-09 PROCEDURE — 93325 DOPPLER ECHO COLOR FLOW MAPG: CPT | Mod: 26 | Performed by: INTERNAL MEDICINE

## 2024-09-09 PROCEDURE — 93350 STRESS TTE ONLY: CPT | Mod: 26 | Performed by: INTERNAL MEDICINE

## 2024-09-09 PROCEDURE — 93325 DOPPLER ECHO COLOR FLOW MAPG: CPT | Mod: TC

## 2024-09-09 PROCEDURE — 93321 DOPPLER ECHO F-UP/LMTD STD: CPT | Mod: 26 | Performed by: INTERNAL MEDICINE

## 2024-09-10 NOTE — RESULT ENCOUNTER NOTE
Reviewed results of Stress Echo with pt over the phone.     No further questions at this time. Will await Zio patch results. Pt states his last day wearing the monitor is 9/13/24.     María SKY  [facility]

## 2024-09-18 PROCEDURE — 93248 EXT ECG>7D<15D REV&INTERPJ: CPT | Performed by: INTERNAL MEDICINE

## 2024-09-19 NOTE — RESULT ENCOUNTER NOTE
Zio monitor looks good, occasional ectopy, but did not really correlate with any symptoms.  No new recommendations.     Josué     Reviewed monitor results with patient's mother. No further questions.

## 2024-11-18 ENCOUNTER — OFFICE VISIT (OUTPATIENT)
Dept: FAMILY MEDICINE | Facility: CLINIC | Age: 24
End: 2024-11-18

## 2024-11-18 VITALS
HEIGHT: 74 IN | WEIGHT: 309 LBS | DIASTOLIC BLOOD PRESSURE: 78 MMHG | HEART RATE: 92 BPM | RESPIRATION RATE: 18 BRPM | SYSTOLIC BLOOD PRESSURE: 140 MMHG | OXYGEN SATURATION: 98 % | BODY MASS INDEX: 39.66 KG/M2

## 2024-11-18 DIAGNOSIS — E55.9 VITAMIN D DEFICIENCY: ICD-10-CM

## 2024-11-18 DIAGNOSIS — J34.2 DEVIATED NASAL SEPTUM: ICD-10-CM

## 2024-11-18 DIAGNOSIS — Z00.00 ROUTINE PHYSICAL EXAMINATION: Primary | ICD-10-CM

## 2024-11-18 DIAGNOSIS — E66.812 CLASS 2 OBESITY IN ADULT, UNSPECIFIED BMI, UNSPECIFIED OBESITY TYPE, UNSPECIFIED WHETHER SERIOUS COMORBIDITY PRESENT: ICD-10-CM

## 2024-11-18 DIAGNOSIS — Z13.1 DIABETES MELLITUS SCREENING: ICD-10-CM

## 2024-11-18 NOTE — NURSING NOTE
Chief Complaint   Patient presents with    Physical     Non fasting annual physical, patient was informed that he is early for his physical and that he may be responsible if his insurance does not cover per calendar year and instead covers every 365 days, patient expressed understanding to this and is fine if he has responsibility for payment    Recheck Medication     Has been using Flonase to help with his nasal drainage, has been using everyday and recently noticed blood coming from nose     Pre-visit Screening:  Immunizations:  not up to date - due for tdap but patient declined   Colonoscopy:  na  Mammogram: na  Asthma Action Test/Plan:  na  PHQ9:  phq2 done today  GAD7:  na  Questioned patient about current smoking habits Pt. has never smoked.  Ok to leave detailed message on voice mail for today's visit only yes, phone # [phone] (home)

## 2024-11-18 NOTE — PROGRESS NOTES
SUBJECTIVE:   CC: David Orta is an 24 year old male who presents for preventive health visit.     Patient has been advised of split billing requirements and indicates understanding: Yes    Nursing Notes:   Nikki Obrien CMA  11/18/2024  4:25 PM  Signed  Chief Complaint   Patient presents with    Physical     Non fasting annual physical, patient was informed that he is early for his physical and that he may be responsible if his insurance does not cover per calendar year and instead covers every 365 days, patient expressed understanding to this and is fine if he has responsibility for payment    Recheck Medication     Has been using Flonase to help with his nasal drainage, has been using everyday and recently noticed blood coming from nose     Pre-visit Screening:  Immunizations:  not up to date - due for tdap but patient declined   Colonoscopy:  na  Mammogram: na  Asthma Action Test/Plan:  na  PHQ9:  phq2 done today  GAD7:  na  Questioned patient about current smoking habits Pt. has never smoked.  Ok to leave detailed message on voice mail for today's visit only yes, phone # [phone] (home)        Healthy Habits:  General health: good - Cardiology workup reassuring   Exercise: gym 3-4x/wk  Sleep: improved with flonase  Mental Health: improved no concerns today     ENT recommended flonase-helpful but having nosebleeds and bloody drainage    Have you had an eye exam in the past two years? yes  Do you see a dentist twice per year? yes  Do you have sleep apnea, excessive snoring or daytime drowsiness? improved      Today's PHQ-2 Score:       11/18/2024     4:16 PM 3/18/2024     4:22 PM   PHQ-2 ( 1999 [employer])   Q1: Little interest or pleasure in doing things 0 0   Q2: Feeling down, depressed or hopeless 0 0   PHQ-2 Score 0 0       Do you feel safe in your environment? Yes        Social History     Tobacco Use    Smoking status: Never    Smokeless tobacco: Never   Substance Use Topics    Alcohol use: No     If  "you drink alcohol do you typically have >3 drinks per day or >7 drinks per week? No                      Last PSA: No results found for: \"PSA\"    Reviewed orders with patient. Reviewed health maintenance and updated orders accordingly - Yes  Lab work is in process  Labs reviewed in EPIC  BP Readings from Last 3 Encounters:   11/18/24 (!) 140/78   08/23/24 (!) 152/86   03/18/24 122/82    Wt Readings from Last 3 Encounters:   11/18/24 140.2 kg (309 lb)   08/23/24 135.8 kg (299 lb 6.4 oz)   03/18/24 137.4 kg (303 lb)         Reviewed and updated as needed this visit by clinical staff   Tobacco  Allergies  Meds              Reviewed and updated as needed this visit by Provider                  No past medical history on file.   Past Surgical History:   Procedure Laterality Date    LAPAROSCOPIC APPENDECTOMY N/A 04/13/2015    Procedure: LAPAROSCOPIC APPENDECTOMY;  Surgeon: Kapil Griffin MD;  Location: [facility] OR     Family History   Problem Relation Age of Onset    Diabetes Mother     Diabetes Father     Cerebrovascular Disease Maternal Aunt     Pacemaker Maternal Aunt        ROS:  12 point ROS performed and negative for new concerns except as mentioned above     OBJECTIVE:   BP (!) 140/78 (BP Location: Right arm, Patient Position: Sitting, Cuff Size: Adult Large)   Pulse 92   Resp 18   Ht 1.88 m (6' 2\")   Wt 140.2 kg (309 lb)   SpO2 98%   BMI 39.67 kg/m    EXAM:  GENERAL: alert and no distress  EYES: Eyes grossly normal to inspection, PERRL and conjunctivae and sclerae normal  HENT: ear canals and TM's normal, nose and mouth without ulcers or lesions  NECK: no adenopathy, no asymmetry, masses, or scars  RESP: lungs clear to auscultation - no rales, rhonchi or wheezes  CV: regular rate and rhythm, normal S1 S2, no S3 or S4, no murmur, click or rub, no peripheral edema  ABDOMEN: soft, nontender, no hepatosplenomegaly, no masses and bowel sounds normal  MS: no gross musculoskeletal defects noted, no " "edema  SKIN: no suspicious lesions or rashes  NEURO: Normal strength and tone, mentation intact and speech normal  PSYCH: mentation appears normal, affect normal/bright    Diagnostic Test Results:  Labs reviewed in Epic    ASSESSMENT/PLAN:       ICD-10-CM    1. Routine physical examination  Z00.00       2. Vitamin D deficiency  E55.9 VITAMIN D DEFICIENCY SCREENING ([facility])      3. Deviated nasal septum  J34.2       4. Class 2 obesity in adult, unspecified BMI, unspecified obesity type, unspecified whether serious comorbidity present  E66.812 Basic Metabolic Panel ([facility])     Lipid Panel ([facility])      5. Diabetes mellitus screening  Z13.1 Basic Metabolic Panel ([facility])           Patient has been advised of split billing requirements and indicates understanding: Yes  COUNSELING:  Reviewed preventive health counseling, as reflected in patient instructions       Regular exercise       Healthy diet/nutrition       Vision screening       Immunizations-declines all       Alcohol Use        Safe sex practices/STD prevention    Estimated body mass index is 39.67 kg/m  as calculated from the following:    Height as of this encounter: 1.88 m (6' 2\").    Weight as of this encounter: 140.2 kg (309 lb).    Weight management plan: Discussed healthy diet and exercise guidelines    He reports that he has never smoked. He has never used smokeless tobacco.      Patient Instructions   Schedule fasting lab appointment     Stop flonase for 3-4 weeks - Can use nasal saline for moisture     If needed in one month can restart flonase with good technique - if continuing to have issues recommend ENT follow-up  [facility]  [address] [place] [address]  [phone] -- appt line    Restart vitamin D supplement until May           Venu Guzman MD, [facility]   "

## 2024-11-18 NOTE — PATIENT INSTRUCTIONS
Schedule fasting lab appointment     Stop flonase for 3-4 weeks - Can use nasal saline for moisture     If needed in one month can restart flonase with good technique - if continuing to have issues recommend ENT follow-up  [facility]  [address] [id number] [phone] -- appt line    Restart vitamin D supplement until May

## 2024-11-21 DIAGNOSIS — Z13.1 DIABETES MELLITUS SCREENING: ICD-10-CM

## 2024-11-21 DIAGNOSIS — E55.9 VITAMIN D DEFICIENCY: ICD-10-CM

## 2024-11-21 DIAGNOSIS — E66.812 CLASS 2 OBESITY IN ADULT, UNSPECIFIED BMI, UNSPECIFIED OBESITY TYPE, UNSPECIFIED WHETHER SERIOUS COMORBIDITY PRESENT: ICD-10-CM

## 2024-11-21 LAB
BUN SERPL-MCNC: 18 MG/DL (ref 7–25)
BUN/CREATININE RATIO: 22 (ref 6–32)
CALCIUM SERPL-MCNC: 9.5 MG/DL (ref 8.6–10.3)
CHLORIDE SERPLBLD-SCNC: 103 MMOL/L (ref 98–110)
CHOLEST SERPL-MCNC: 162 MG/DL (ref 0–199)
CHOLEST/HDLC SERPL: 3 {RATIO} (ref 0–5)
CO2 SERPL-SCNC: 27.9 MMOL/L (ref 20–32)
CREAT SERPL-MCNC: 0.83 MG/DL (ref 0.6–1.3)
GLUCOSE SERPL-MCNC: 102 MG/DL (ref 60–99)
HDLC SERPL-MCNC: 53 MG/DL (ref 40–150)
LDLC SERPL CALC-MCNC: 95 MG/DL (ref 0–129)
POTASSIUM SERPL-SCNC: 4.47 MMOL/L (ref 3.5–5.3)
SODIUM SERPL-SCNC: 140.9 MMOL/L (ref 135–146)
TRIGL SERPL-MCNC: 70 MG/DL (ref 0–149)